# Patient Record
Sex: FEMALE | Race: WHITE | NOT HISPANIC OR LATINO | Employment: OTHER | URBAN - METROPOLITAN AREA
[De-identification: names, ages, dates, MRNs, and addresses within clinical notes are randomized per-mention and may not be internally consistent; named-entity substitution may affect disease eponyms.]

---

## 2018-04-24 ENCOUNTER — HOSPITAL ENCOUNTER (INPATIENT)
Facility: HOSPITAL | Age: 83
LOS: 2 days | Discharge: HOME HEALTH | DRG: 947 | End: 2018-04-26
Attending: SPECIALIST | Admitting: EMERGENCY MEDICINE
Payer: MEDICARE

## 2018-04-24 DIAGNOSIS — R40.4 ALTERED AWARENESS, TRANSIENT: Primary | ICD-10-CM

## 2018-04-24 DIAGNOSIS — E86.0 DEHYDRATION: ICD-10-CM

## 2018-04-24 DIAGNOSIS — I50.9 CHF (CONGESTIVE HEART FAILURE): ICD-10-CM

## 2018-04-24 DIAGNOSIS — R79.1 ELEVATED INR: ICD-10-CM

## 2018-04-24 DIAGNOSIS — R79.89 ELEVATED TROPONIN: ICD-10-CM

## 2018-04-24 PROBLEM — I48.91 ATRIAL FIBRILLATION: Status: ACTIVE | Noted: 2018-04-24

## 2018-04-24 PROBLEM — E87.5 HYPERKALEMIA: Status: ACTIVE | Noted: 2018-04-24

## 2018-04-24 PROBLEM — N17.9 ACUTE RENAL FAILURE: Status: ACTIVE | Noted: 2018-04-24

## 2018-04-24 PROBLEM — R53.1 GENERALIZED WEAKNESS: Status: ACTIVE | Noted: 2018-04-24

## 2018-04-24 LAB
25(OH)D3+25(OH)D2 SERPL-MCNC: 27 NG/ML
ALBUMIN SERPL BCP-MCNC: 3.6 G/DL
ALP SERPL-CCNC: 71 U/L
ALT SERPL W/O P-5'-P-CCNC: 17 U/L
AMMONIA PLAS-SCNC: 30 UMOL/L
AMPHET+METHAMPHET UR QL: NEGATIVE
ANION GAP SERPL CALC-SCNC: 9 MMOL/L
APAP SERPL-MCNC: <3 UG/ML
APTT BLDCRRT: 42.9 SEC
AST SERPL-CCNC: 29 U/L
BACTERIA #/AREA URNS HPF: NORMAL /HPF
BARBITURATES UR QL SCN>200 NG/ML: NEGATIVE
BASOPHILS # BLD AUTO: 0.03 K/UL
BASOPHILS NFR BLD: 0.5 %
BENZODIAZ UR QL SCN>200 NG/ML: NEGATIVE
BILIRUB SERPL-MCNC: 0.5 MG/DL
BILIRUB UR QL STRIP: NEGATIVE
BNP SERPL-MCNC: 287 PG/ML
BUN SERPL-MCNC: 37 MG/DL
BZE UR QL SCN: NEGATIVE
CALCIUM SERPL-MCNC: 10.1 MG/DL
CANNABINOIDS UR QL SCN: NEGATIVE
CHLORIDE SERPL-SCNC: 105 MMOL/L
CLARITY UR: ABNORMAL
CO2 SERPL-SCNC: 20 MMOL/L
COLOR UR: YELLOW
CREAT SERPL-MCNC: 1.7 MG/DL
CREAT UR-MCNC: 74.8 MG/DL
DIFFERENTIAL METHOD: ABNORMAL
EOSINOPHIL # BLD AUTO: 0.1 K/UL
EOSINOPHIL NFR BLD: 1.4 %
ERYTHROCYTE [DISTWIDTH] IN BLOOD BY AUTOMATED COUNT: 13.8 %
EST. GFR  (AFRICAN AMERICAN): 29 ML/MIN/1.73 M^2
EST. GFR  (NON AFRICAN AMERICAN): 25 ML/MIN/1.73 M^2
ETHANOL SERPL-MCNC: <10 MG/DL
FOLATE SERPL-MCNC: 4.3 NG/ML
GLUCOSE SERPL-MCNC: 112 MG/DL
GLUCOSE UR QL STRIP: NEGATIVE
HCT VFR BLD AUTO: 42.1 %
HGB BLD-MCNC: 14.4 G/DL
HGB UR QL STRIP: ABNORMAL
INR PPP: 4.5
KETONES UR QL STRIP: NEGATIVE
LEUKOCYTE ESTERASE UR QL STRIP: ABNORMAL
LYMPHOCYTES # BLD AUTO: 2.4 K/UL
LYMPHOCYTES NFR BLD: 41.3 %
MAGNESIUM SERPL-MCNC: 2.6 MG/DL
MCH RBC QN AUTO: 34.5 PG
MCHC RBC AUTO-ENTMCNC: 34.2 G/DL
MCV RBC AUTO: 101 FL
METHADONE UR QL SCN>300 NG/ML: NEGATIVE
MICROSCOPIC COMMENT: NORMAL
MONOCYTES # BLD AUTO: 0.6 K/UL
MONOCYTES NFR BLD: 10 %
NEUTROPHILS # BLD AUTO: 2.7 K/UL
NEUTROPHILS NFR BLD: 46.8 %
NITRITE UR QL STRIP: NEGATIVE
OPIATES UR QL SCN: NEGATIVE
PCP UR QL SCN>25 NG/ML: NEGATIVE
PH UR STRIP: 6 [PH] (ref 5–8)
PLATELET # BLD AUTO: 176 K/UL
PMV BLD AUTO: 10.2 FL
POTASSIUM SERPL-SCNC: 5.2 MMOL/L
PROCALCITONIN SERPL IA-MCNC: 0.05 NG/ML
PROT SERPL-MCNC: 7.5 G/DL
PROT UR QL STRIP: NEGATIVE
PROTHROMBIN TIME: 47.2 SEC
RBC # BLD AUTO: 4.17 M/UL
RBC #/AREA URNS HPF: 4 /HPF (ref 0–4)
SALICYLATES SERPL-MCNC: <5 MG/DL
SODIUM SERPL-SCNC: 134 MMOL/L
SP GR UR STRIP: 1.01 (ref 1–1.03)
SQUAMOUS #/AREA URNS HPF: 2 /HPF
TOXICOLOGY INFORMATION: NORMAL
TROPONIN I SERPL DL<=0.01 NG/ML-MCNC: 0.05 NG/ML
TROPONIN I SERPL DL<=0.01 NG/ML-MCNC: 0.06 NG/ML
TSH SERPL DL<=0.005 MIU/L-ACNC: 1.05 UIU/ML
URN SPEC COLLECT METH UR: ABNORMAL
UROBILINOGEN UR STRIP-ACNC: NEGATIVE EU/DL
VIT B12 SERPL-MCNC: 431 PG/ML
WBC # BLD AUTO: 5.79 K/UL
WBC #/AREA URNS HPF: 5 /HPF (ref 0–5)

## 2018-04-24 PROCEDURE — 96375 TX/PRO/DX INJ NEW DRUG ADDON: CPT | Mod: 59

## 2018-04-24 PROCEDURE — 83735 ASSAY OF MAGNESIUM: CPT

## 2018-04-24 PROCEDURE — 83880 ASSAY OF NATRIURETIC PEPTIDE: CPT

## 2018-04-24 PROCEDURE — 96361 HYDRATE IV INFUSION ADD-ON: CPT | Mod: 59

## 2018-04-24 PROCEDURE — 93005 ELECTROCARDIOGRAM TRACING: CPT

## 2018-04-24 PROCEDURE — 02HV33Z INSERTION OF INFUSION DEVICE INTO SUPERIOR VENA CAVA, PERCUTANEOUS APPROACH: ICD-10-PCS | Performed by: RADIOLOGY

## 2018-04-24 PROCEDURE — 93010 ELECTROCARDIOGRAM REPORT: CPT | Mod: ,,, | Performed by: INTERNAL MEDICINE

## 2018-04-24 PROCEDURE — 80329 ANALGESICS NON-OPIOID 1 OR 2: CPT

## 2018-04-24 PROCEDURE — 11000001 HC ACUTE MED/SURG PRIVATE ROOM

## 2018-04-24 PROCEDURE — 80307 DRUG TEST PRSMV CHEM ANLYZR: CPT

## 2018-04-24 PROCEDURE — 96374 THER/PROPH/DIAG INJ IV PUSH: CPT | Mod: 59

## 2018-04-24 PROCEDURE — 82607 VITAMIN B-12: CPT

## 2018-04-24 PROCEDURE — 84443 ASSAY THYROID STIM HORMONE: CPT

## 2018-04-24 PROCEDURE — 86592 SYPHILIS TEST NON-TREP QUAL: CPT

## 2018-04-24 PROCEDURE — 82140 ASSAY OF AMMONIA: CPT

## 2018-04-24 PROCEDURE — 84484 ASSAY OF TROPONIN QUANT: CPT

## 2018-04-24 PROCEDURE — 85730 THROMBOPLASTIN TIME PARTIAL: CPT

## 2018-04-24 PROCEDURE — 25000003 PHARM REV CODE 250: Performed by: NURSE PRACTITIONER

## 2018-04-24 PROCEDURE — 82306 VITAMIN D 25 HYDROXY: CPT

## 2018-04-24 PROCEDURE — 80053 COMPREHEN METABOLIC PANEL: CPT

## 2018-04-24 PROCEDURE — 36569 INSJ PICC 5 YR+ W/O IMAGING: CPT

## 2018-04-24 PROCEDURE — G0378 HOSPITAL OBSERVATION PER HR: HCPCS

## 2018-04-24 PROCEDURE — 63600175 PHARM REV CODE 636 W HCPCS: Performed by: NURSE PRACTITIONER

## 2018-04-24 PROCEDURE — 84145 PROCALCITONIN (PCT): CPT

## 2018-04-24 PROCEDURE — 99285 EMERGENCY DEPT VISIT HI MDM: CPT | Mod: 25

## 2018-04-24 PROCEDURE — 25000003 PHARM REV CODE 250: Performed by: SPECIALIST

## 2018-04-24 PROCEDURE — 81000 URINALYSIS NONAUTO W/SCOPE: CPT

## 2018-04-24 PROCEDURE — 85610 PROTHROMBIN TIME: CPT

## 2018-04-24 PROCEDURE — S0171 BUMETANIDE 0.5 MG: HCPCS | Performed by: NURSE PRACTITIONER

## 2018-04-24 PROCEDURE — 80320 DRUG SCREEN QUANTALCOHOLS: CPT

## 2018-04-24 PROCEDURE — 82746 ASSAY OF FOLIC ACID SERUM: CPT

## 2018-04-24 PROCEDURE — 85025 COMPLETE CBC W/AUTO DIFF WBC: CPT

## 2018-04-24 RX ORDER — DULOXETIN HYDROCHLORIDE 20 MG/1
20 CAPSULE, DELAYED RELEASE ORAL DAILY
Status: DISCONTINUED | OUTPATIENT
Start: 2018-04-25 | End: 2018-04-26 | Stop reason: HOSPADM

## 2018-04-24 RX ORDER — DORZOLAMIDE HCL 20 MG/ML
1 SOLUTION/ DROPS OPHTHALMIC 2 TIMES DAILY
COMMUNITY

## 2018-04-24 RX ORDER — ACEBUTOLOL HYDROCHLORIDE 200 MG/1
200 CAPSULE ORAL 2 TIMES DAILY
Status: DISCONTINUED | OUTPATIENT
Start: 2018-04-24 | End: 2018-04-26 | Stop reason: HOSPADM

## 2018-04-24 RX ORDER — RAMELTEON 8 MG/1
8 TABLET ORAL NIGHTLY PRN
Status: DISCONTINUED | OUTPATIENT
Start: 2018-04-24 | End: 2018-04-26 | Stop reason: HOSPADM

## 2018-04-24 RX ORDER — WARFARIN 1 MG/1
1 TABLET ORAL DAILY
Status: DISCONTINUED | OUTPATIENT
Start: 2018-04-26 | End: 2018-04-26

## 2018-04-24 RX ORDER — FAMOTIDINE 20 MG/1
20 TABLET, FILM COATED ORAL DAILY
Status: DISCONTINUED | OUTPATIENT
Start: 2018-04-25 | End: 2018-04-26 | Stop reason: HOSPADM

## 2018-04-24 RX ORDER — FLUTICASONE PROPIONATE 50 MCG
1 SPRAY, SUSPENSION (ML) NASAL DAILY
COMMUNITY
End: 2018-06-25

## 2018-04-24 RX ORDER — DORZOLAMIDE HCL 20 MG/ML
1 SOLUTION/ DROPS OPHTHALMIC 2 TIMES DAILY
Status: DISCONTINUED | OUTPATIENT
Start: 2018-04-24 | End: 2018-04-26 | Stop reason: HOSPADM

## 2018-04-24 RX ORDER — DULOXETIN HYDROCHLORIDE 20 MG/1
20 CAPSULE, DELAYED RELEASE ORAL DAILY
COMMUNITY

## 2018-04-24 RX ORDER — BUMETANIDE 0.25 MG/ML
1 INJECTION INTRAMUSCULAR; INTRAVENOUS 2 TIMES DAILY
Status: DISCONTINUED | OUTPATIENT
Start: 2018-04-24 | End: 2018-04-26 | Stop reason: HOSPADM

## 2018-04-24 RX ORDER — LEVOTHYROXINE SODIUM 88 UG/1
88 TABLET ORAL DAILY
COMMUNITY

## 2018-04-24 RX ORDER — LEVOTHYROXINE SODIUM 88 UG/1
88 TABLET ORAL
Status: DISCONTINUED | OUTPATIENT
Start: 2018-04-25 | End: 2018-04-26 | Stop reason: HOSPADM

## 2018-04-24 RX ORDER — ASPIRIN 325 MG
325 TABLET ORAL DAILY
Status: DISCONTINUED | OUTPATIENT
Start: 2018-04-25 | End: 2018-04-26 | Stop reason: HOSPADM

## 2018-04-24 RX ORDER — ONDANSETRON 2 MG/ML
4 INJECTION INTRAMUSCULAR; INTRAVENOUS EVERY 8 HOURS PRN
Status: DISCONTINUED | OUTPATIENT
Start: 2018-04-24 | End: 2018-04-26 | Stop reason: HOSPADM

## 2018-04-24 RX ORDER — ACETAMINOPHEN 325 MG/1
650 TABLET ORAL EVERY 6 HOURS PRN
Status: DISCONTINUED | OUTPATIENT
Start: 2018-04-24 | End: 2018-04-26 | Stop reason: HOSPADM

## 2018-04-24 RX ORDER — HYDRALAZINE HYDROCHLORIDE 20 MG/ML
10 INJECTION INTRAMUSCULAR; INTRAVENOUS EVERY 8 HOURS PRN
Status: DISCONTINUED | OUTPATIENT
Start: 2018-04-24 | End: 2018-04-26 | Stop reason: HOSPADM

## 2018-04-24 RX ORDER — BUMETANIDE 2 MG/1
2 TABLET ORAL DAILY
COMMUNITY

## 2018-04-24 RX ORDER — WARFARIN SODIUM 5 MG/1
5 TABLET ORAL DAILY
Status: ON HOLD | COMMUNITY
End: 2018-04-26

## 2018-04-24 RX ORDER — ACEBUTOLOL HYDROCHLORIDE 200 MG/1
200 CAPSULE ORAL 2 TIMES DAILY
COMMUNITY

## 2018-04-24 RX ADMIN — SODIUM CHLORIDE 1000 ML: 0.9 INJECTION, SOLUTION INTRAVENOUS at 11:04

## 2018-04-24 RX ADMIN — RAMELTEON 8 MG: 8 TABLET, FILM COATED ORAL at 11:04

## 2018-04-24 RX ADMIN — BUMETANIDE 1 MG: 0.25 INJECTION INTRAMUSCULAR; INTRAVENOUS at 06:04

## 2018-04-24 RX ADMIN — DORZOLAMIDE HYDROCHLORIDE 1 DROP: 20 SOLUTION/ DROPS OPHTHALMIC at 10:04

## 2018-04-24 RX ADMIN — HYDRALAZINE HYDROCHLORIDE 10 MG: 20 INJECTION INTRAMUSCULAR; INTRAVENOUS at 07:04

## 2018-04-24 RX ADMIN — ACEBUTOLOL HYDROCHLORIDE 200 MG: 200 CAPSULE ORAL at 10:04

## 2018-04-24 RX ADMIN — ACETAMINOPHEN 650 MG: 325 TABLET, FILM COATED ORAL at 10:04

## 2018-04-24 NOTE — OP NOTE
The LUE was sterilely prepped and draped.  Lidocaine was used as a local anesthetic.  Using u/s guidance a 5 Polish 37 cm picc was placed into the basilic vein into the SVC/right atrium junction.  Placement was verified using X Ray.  PICC was secured in place with attachment device and is ready to use.  Pt tolerated proc well without immediate complications.

## 2018-04-24 NOTE — ASSESSMENT & PLAN NOTE
-Improved    - Bumex 1 mg BID, can switch back to home PO dose tomorrow     - 2D ECHO showed normal left ventricular systolic function (EF 55-60%) and normal right ventricular systolic function, severe moderate to severe AS.   - Strict I&Os  - Daily weights  - Low salt diet  - Continue BB

## 2018-04-24 NOTE — ED PROVIDER NOTES
"SCRIBE #1 NOTE: I, Toni Bennett, am scribing for, and in the presence of, aMris Overton MD. I have scribed the entire note.      History      Chief Complaint   Patient presents with    Dementia     GCS 14, worsening memory per family, pt has been accusing people of breaking into her house       Review of patient's allergies indicates:  Allergies not on file     HPI   HPI    4/24/2018, 10:44 AM   History obtained from the family      History of Present Illness: Daija Edward is a 89 y.o. female patient w/ PMhx of Afib and Dementia presents to the Emergency Department for AMS which onset gradually last PM. Per the family, the patient "is hallucinating, saying that people are in her house when no one is there." Family also reports that the patient has had a decline in her mental status over the past month, stating that the patient "is more forgetful than she normally is." Symptoms are constant and moderate in severity.  No mitigating or exacerbating factors reported. HPI and ROS are limited due to patient's history of dementia.       Arrival mode: Ambulance service    PCP: Kylie Fragoso MD       Past Medical History:  Past Medical History:   Diagnosis Date    A-fib     Dementia        Past Surgical History:  Hx reviewed, not pertinent    Family History:  Hx reviewed, not pertinent    Social History:  Social History     Social History Main Topics    Smoking status: Not on file    Smokeless tobacco: Not on file    Alcohol use Not on file    Drug use: Unknown    Sexual activity: Not on file       ROS   Review of Systems   Unable to perform ROS: Dementia   Psychiatric/Behavioral:        + AMS       Physical Exam      Initial Vitals [04/24/18 1042]   BP Pulse Resp Temp SpO2   (!) 170/97 76 18 99 °F (37.2 °C) 99 %      MAP       121.33           Physical Exam  Nursing Notes and Vital Signs Reviewed.  Constitutional: Patient is in no apparent distress. Well-developed and well-nourished.  Head: Atraumatic. " "Normocephalic.  Eyes: PERRL. EOM intact. Conjunctivae are not pale. No scleral icterus.  ENT: Mucous membranes are moist. Oropharynx is clear and symmetric.    Neck: Supple. Full ROM. No lymphadenopathy.  Cardiovascular: Regular rate. Regular rhythm. No murmurs, rubs, or gallops. Distal pulses are 2+ and symmetric.  Pulmonary/Chest: No respiratory distress. Clear to auscultation bilaterally. No wheezing or rales.  Abdominal: Soft and non-distended.  There is no tenderness.  No rebound, guarding, or rigidity. Good bowel sounds.  Musculoskeletal: Moves all extremities. No obvious deformities. No edema. No calf tenderness. Chronic contractures of the right hand and arm.   Skin: Warm and dry. Multiple areas of ecchymosis noted to the BLE.   Neurological:  Alert, awake, and appropriate.  Normal speech.  No acute focal neurological deficits are appreciated.  Psychiatric: Normal affect. Good eye contact. Appropriate in content.    ED Course    Procedures  ED Vital Signs:  Vitals:    04/24/18 1042 04/24/18 1052 04/24/18 1102   BP: (!) 170/97  (!) 191/88   Pulse: 76  79   Resp: 18  16   Temp: 99 °F (37.2 °C)     TempSrc: Oral     SpO2: 99%  97%   Weight:  65.3 kg (144 lb)    Height: 5' 2" (1.575 m)         Abnormal Lab Results:  Labs Reviewed   CBC W/ AUTO DIFFERENTIAL - Abnormal; Notable for the following:        Result Value     (*)     MCH 34.5 (*)     All other components within normal limits   COMPREHENSIVE METABOLIC PANEL - Abnormal; Notable for the following:     Sodium 134 (*)     Potassium 5.2 (*)     CO2 20 (*)     Glucose 112 (*)     BUN, Bld 37 (*)     Creatinine 1.7 (*)     eGFR if  29 (*)     eGFR if non  25 (*)     All other components within normal limits   TROPONIN I - Abnormal; Notable for the following:     Troponin I 0.049 (*)     All other components within normal limits   B-TYPE NATRIURETIC PEPTIDE - Abnormal; Notable for the following:      (*)     All " other components within normal limits   URINALYSIS - Abnormal; Notable for the following:     Appearance, UA Hazy (*)     Occult Blood UA Trace (*)     Leukocytes, UA Trace (*)     All other components within normal limits   MAGNESIUM   DRUG SCREEN PANEL, URINE EMERGENCY   URINALYSIS MICROSCOPIC   AMMONIA   TSH   ACETAMINOPHEN LEVEL   ALCOHOL,MEDICAL (ETHANOL)   SALICYLATE LEVEL   PROTIME-INR   APTT   PROCALCITONIN   RPR   VITAMIN B12   VITAMIN D   FOLATE        All Lab Results:  Results for orders placed or performed during the hospital encounter of 04/24/18   CBC auto differential   Result Value Ref Range    WBC 5.79 3.90 - 12.70 K/uL    RBC 4.17 4.00 - 5.40 M/uL    Hemoglobin 14.4 12.0 - 16.0 g/dL    Hematocrit 42.1 37.0 - 48.5 %     (H) 82 - 98 fL    MCH 34.5 (H) 27.0 - 31.0 pg    MCHC 34.2 32.0 - 36.0 g/dL    RDW 13.8 11.5 - 14.5 %    Platelets 176 150 - 350 K/uL    MPV 10.2 9.2 - 12.9 fL    Gran # (ANC) 2.7 1.8 - 7.7 K/uL    Lymph # 2.4 1.0 - 4.8 K/uL    Mono # 0.6 0.3 - 1.0 K/uL    Eos # 0.1 0.0 - 0.5 K/uL    Baso # 0.03 0.00 - 0.20 K/uL    Gran% 46.8 38.0 - 73.0 %    Lymph% 41.3 18.0 - 48.0 %    Mono% 10.0 4.0 - 15.0 %    Eosinophil% 1.4 0.0 - 8.0 %    Basophil% 0.5 0.0 - 1.9 %    Differential Method Automated    Comprehensive metabolic panel   Result Value Ref Range    Sodium 134 (L) 136 - 145 mmol/L    Potassium 5.2 (H) 3.5 - 5.1 mmol/L    Chloride 105 95 - 110 mmol/L    CO2 20 (L) 23 - 29 mmol/L    Glucose 112 (H) 70 - 110 mg/dL    BUN, Bld 37 (H) 10 - 30 mg/dL    Creatinine 1.7 (H) 0.5 - 1.4 mg/dL    Calcium 10.1 8.7 - 10.5 mg/dL    Total Protein 7.5 6.0 - 8.4 g/dL    Albumin 3.6 3.5 - 5.2 g/dL    Total Bilirubin 0.5 0.1 - 1.0 mg/dL    Alkaline Phosphatase 71 55 - 135 U/L    AST 29 10 - 40 U/L    ALT 17 10 - 44 U/L    Anion Gap 9 8 - 16 mmol/L    eGFR if African American 29 (A) >60 mL/min/1.73 m^2    eGFR if non African American 25 (A) >60 mL/min/1.73 m^2   Magnesium   Result Value Ref Range     Magnesium 2.6 1.6 - 2.6 mg/dL   Troponin I   Result Value Ref Range    Troponin I 0.049 (H) 0.000 - 0.026 ng/mL   Brain natriuretic peptide   Result Value Ref Range     (H) 0 - 99 pg/mL   Urinalysis   Result Value Ref Range    Specimen UA Urine, Clean Catch     Color, UA Yellow Yellow, Straw, Nadeen    Appearance, UA Hazy (A) Clear    pH, UA 6.0 5.0 - 8.0    Specific Gravity, UA 1.010 1.005 - 1.030    Protein, UA Negative Negative    Glucose, UA Negative Negative    Ketones, UA Negative Negative    Bilirubin (UA) Negative Negative    Occult Blood UA Trace (A) Negative    Nitrite, UA Negative Negative    Urobilinogen, UA Negative <2.0 EU/dL    Leukocytes, UA Trace (A) Negative   Drug screen panel, emergency   Result Value Ref Range    Benzodiazepines Negative     Methadone metabolites Negative     Cocaine (Metab.) Negative     Opiate Scrn, Ur Negative     Barbiturate Screen, Ur Negative     Amphetamine Screen, Ur Negative     THC Negative     Phencyclidine Negative     Creatinine, Random Ur 74.8 15.0 - 325.0 mg/dL    Toxicology Information SEE COMMENT    Urinalysis Microscopic   Result Value Ref Range    RBC, UA 4 0 - 4 /hpf    WBC, UA 5 0 - 5 /hpf    Bacteria, UA Rare None-Occ /hpf    Squam Epithel, UA 2 /hpf    Microscopic Comment SEE COMMENT          Imaging Results:  Imaging Results          FL PICC Line Placement (xpd) (In process)                CT Head Without Contrast (Final result)  Result time 04/24/18 14:13:53    Final result by Ino Han MD (04/24/18 14:13:53)                 Impression:           No CT evidence of acute intracranial abnormality.  Senescent changes.    All CT scans at this facility use dose modulation, iterative reconstruction and/or weight based dosing when appropriate to reduce radiation dose to as low as reasonably achievable.       Electronically signed by: INO HAN MD  Date:     04/24/18  Time:    14:13              Narrative:    Exam:  CT HEAD WITHOUT  CONTRAST    Clinical History:  Altered mental status. Confusion/delirium, altered LOC, unexplained     Technique: Axial CT imaging was performed through the head without intravenous contrast.     Comparison:  None     Findings:     Age appropriate generalized cerebral and cerebellar atrophy. Moderate, symmetric, low density changes in the periventricular white matter consistent with chronic small vessel ischemic change.   No intracranial hemorrhage is identified.   No hydrocephalus, midline shift or mass effect.  The calvarium is intact.   Visualized paranasal sinuses and mastoid air cells are clear.   Atheromatous calcifications involve the bilateral cavernous carotid arteries.                             X-Ray Chest 1 View (Final result)  Result time 04/24/18 11:57:37    Final result by Ino Han MD (04/24/18 11:57:37)                 Impression:     No acute infiltrates.  Cardiomegaly.            Electronically signed by: INO HAN MD  Date:     04/24/18  Time:    11:57              Narrative:    Exam: XR CHEST 1 VIEW    Clinical History: Shortness of breath.  Altered mental status.    Findings:     The lungs are clear. Marked cardiomegaly.  Aortic atherosclerosis.  Posttraumatic and glenohumeral effusion.                             The EKG was ordered, reviewed, and independently interpreted by the ED provider.  Interpretation time: 11:48  Rate: 80 BPM  Rhythm: atrial fibrillation with premature ventricular or aberrantly conducted complexes  Interpretation: Incomplete RBBB. Anteroseptal infarct. No STEMI.           The Emergency Provider reviewed the vital signs and test results, which are outlined above.    ED Discussion     4:07 PM: Discussed case with MAURICIO Alexander ([facility]). Dr. Syed agrees with current care and management of pt and accepts admission.   Admitting Service: Hospital medicine   Admitting Physician: Dr. Syed  Admit to: Obs/ tele    4:07 PM: Re-evaluated pt. I have  discussed test results, shared treatment plan, and the need for admission with patient and family at bedside. Pt and family express understanding at this time and agree with all information. All questions answered. Pt and family have no further questions or concerns at this time. Pt is ready for admit.      ED Medication(s):  Medications   sodium chloride 0.9% bolus 1,000 mL (0 mLs Intravenous Stopped 4/24/18 1330)       New Prescriptions    No medications on file             Medical Decision Making    Medical Decision Making:   Clinical Tests:   Lab Tests: Reviewed and Ordered  Radiological Study: Reviewed and Ordered  Medical Tests: Reviewed and Ordered           Scribe Attestation:   Scribe #1: I performed the above scribed service and the documentation accurately describes the services I performed. I attest to the accuracy of the note.    Attending:   Physician Attestation Statement for Scribe #1: I, Maris Overton MD, personally performed the services described in this documentation, as scribed by Toni Guajardo, in my presence, and it is both accurate and complete.          Clinical Impression       ICD-10-CM ICD-9-CM   1. Altered awareness, transient R40.4 780.02   2. CHF (congestive heart failure) I50.9 428.0   3. Dehydration E86.0 276.51   4. Elevated troponin R74.8 790.6       Disposition:   Disposition: Placed in Observation  Condition: Fair         Maris Overton MD  04/24/18 0330

## 2018-04-24 NOTE — ASSESSMENT & PLAN NOTE
-   - PE: moderate nonpitting edema to BLE  - Pt reports she takes Lasix daily but has been noticing her legs swelling more  - Bumex 1 mg IVP BID  - Strict I&Os  - Daily weights  - Low salt diet  - Continue BB

## 2018-04-24 NOTE — H&P
"[facility] Hospital Medicine  History & Physical    Patient Name: Daija Edward  MRN: [id number]  Admission Date: 4/24/2018  Attending Physician: Maris Overton MD   Primary Care Provider: Kylie Fragoso MD         Patient information was obtained from patient, relative(s) and ER records.     Subjective:     Principal Problem:Altered awareness, transient    Chief Complaint:   Chief Complaint   Patient presents with    Dementia     GCS 14, worsening memory per family, pt has been accusing people of breaking into her house        HPI: Daija Edward is a 89 year female with a PMHx of CHF, A-fib, and Migraines who presented to the Emergency Department with c/o AMS. Family reports pt stays by herself with sitter care for a couple of hours a day. Pt reportedly having hallucinations of people trying to come into her house. Police department was called twice. Step daughter and grand daughter at bedside also reports that the patient has had a decline in her mental status over the past month, stating that the patient "is more forgetful than she normally is." Family denies hx of Dementia. Pt states she only saw a neurologist for her hx of migraines. Family is in the process of trying to get 24 hr sitter care. ED workup revealed: CBC stable, K+ 5.4, BUN 37, creatinine 1.7, , troponin 0.049. CXR with no acute infiltrates, cardiomegaly. CT head with no acute intracranial findings, chronic small vessel ischemic change. Pt placed in Observation.    Past Medical History:   Diagnosis Date    A-fib     Dementia        No past surgical history on file.    Review of patient's allergies indicates:  No Known Allergies    No current facility-administered medications on file prior to encounter.      No current outpatient prescriptions on file prior to encounter.     Family History     Reviewed. Not pertinent        Social History Main Topics    Smoking status: Not on file    Smokeless tobacco: Not on file    " Alcohol use Not on file    Drug use: Unknown    Sexual activity: Not on file     Review of Systems   Constitutional: Positive for activity change.   HENT: Negative.    Eyes: Negative.    Respiratory: Negative for apnea, cough, choking, chest tightness, shortness of breath, wheezing and stridor.    Cardiovascular: Positive for leg swelling. Negative for chest pain and palpitations.   Gastrointestinal: Negative for abdominal pain, constipation, diarrhea, nausea and vomiting.   Endocrine: Negative.    Genitourinary: Negative.    Musculoskeletal: Negative.    Skin: Negative.    Allergic/Immunologic: Negative.    Neurological: Negative for dizziness, tremors, seizures, syncope, facial asymmetry, speech difficulty, weakness, light-headedness, numbness and headaches.   Hematological: Negative.    Psychiatric/Behavioral: Positive for hallucinations.     Objective:     Vital Signs (Most Recent):  Temp: 99 °F (37.2 °C) (04/24/18 1042)  Pulse: 79 (04/24/18 1102)  Resp: 16 (04/24/18 1102)  BP: (!) 191/88 (04/24/18 1102)  SpO2: 97 % (04/24/18 1102) Vital Signs (24h Range):  Temp:  [99 °F (37.2 °C)] 99 °F (37.2 °C)  Pulse:  [76-79] 79  Resp:  [16-18] 16  SpO2:  [97 %-99 %] 97 %  BP: (170-191)/(88-97) 191/88     Weight: 65.3 kg (144 lb)  Body mass index is 26.34 kg/m².    Physical Exam   Constitutional: She is oriented to person, place, and time. She appears well-developed. No distress.   HENT:   Head: Normocephalic and atraumatic.   Eyes: EOM are normal.   Neck: Normal range of motion. Neck supple.   Cardiovascular: Normal rate.  An irregular rhythm present.   Murmur heard.  A-fib noted  Moderate nonpitting edema to BLE   Pulmonary/Chest: Effort normal and breath sounds normal. No respiratory distress. She has no wheezes. She has no rales. She exhibits no tenderness.   Abdominal: Soft. Bowel sounds are normal. She exhibits no distension. There is no tenderness. There is no rebound and no guarding.   Genitourinary:    Genitourinary Comments: Deferred   Musculoskeletal: She exhibits edema.   Neurological: She is alert and oriented to person, place, and time. No sensory deficit.   Skin: Skin is warm and dry. Capillary refill takes less than 2 seconds.   Psychiatric: She has a normal mood and affect. Her behavior is normal. Judgment and thought content normal.   Nursing note and vitals reviewed.        CRANIAL NERVES     CN III, IV, VI   Extraocular motions are normal.        Significant Labs:   CBC:   Recent Labs  Lab 04/24/18  1403   WBC 5.79   HGB 14.4   HCT 42.1        CMP:   Recent Labs  Lab 04/24/18  1403   *   K 5.2*      CO2 20*   *   BUN 37*   CREATININE 1.7*   CALCIUM 10.1   PROT 7.5   ALBUMIN 3.6   BILITOT 0.5   ALKPHOS 71   AST 29   ALT 17   ANIONGAP 9   EGFRNONAA 25*     Magnesium:   Recent Labs  Lab 04/24/18  1403   MG 2.6     Troponin:   Recent Labs  Lab 04/24/18  1403   TROPONINI 0.049*     Urine Studies:   Recent Labs  Lab 04/24/18  1255   COLORU Yellow   APPEARANCEUA Hazy*   PHUR 6.0   SPECGRAV 1.010   PROTEINUA Negative   GLUCUA Negative   KETONESU Negative   BILIRUBINUA Negative   OCCULTUA Trace*   NITRITE Negative   UROBILINOGEN Negative   LEUKOCYTESUR Trace*   RBCUA 4   WBCUA 5   BACTERIA Rare   SQUAMEPITHEL 2       Significant Imaging:   Imaging Results          FL PICC Line Placement (xpd) (Final result)  Result time 04/24/18 16:16:24    Final result by Ino Han MD (04/24/18 16:16:24)                 Impression:      1. Successful placement of a  PICC, using ultrasound for access and fluoroscopy to determine position of the PICC.         Electronically signed by: INO HAN MD  Date:     04/24/18  Time:    16:16              Narrative:    Procedure:     Fluoroscopically guided PICC placement.    Clinical History:   Poor IV access    Technique/findings: After the risks, benefits and options of the planned procedure were explained to the  daughter  in full detail,  she   expressed complete understanding and gave  her  informed consent.  Casper Juarez PA-C, assisted in this procedure.  The  left  upper arm was prepped and draped in the usual sterile fashion. Sterile maximum barrier protocol was utilized.  A timeout was performed. 1% lidocaine was used as a local anesthetic, and under ultrasound guidance, a micropuncture set was used to gain access to the  basilic  vein. Through a peel-away sheath, a 5  [place] dual-lumen PICC trimmed to  37  cm was placed. Fluoroscopy was used to determine the position of the PICC , and the tip is within the SVC  . The PICC was then anchored into place, capped and flushed. A sterile dressing was applied. The patient tolerated the procedure well without complication.                             CT Head Without Contrast (Final result)  Result time 04/24/18 14:13:53    Final result by Ino Han MD (04/24/18 14:13:53)                 Impression:           No CT evidence of acute intracranial abnormality.  Senescent changes.    All CT scans at this facility use dose modulation, iterative reconstruction and/or weight based dosing when appropriate to reduce radiation dose to as low as reasonably achievable.       Electronically signed by: INO HAN MD  Date:     04/24/18  Time:    14:13              Narrative:    Exam:  CT HEAD WITHOUT CONTRAST    Clinical History:  Altered mental status. Confusion/delirium, altered LOC, unexplained     Technique: Axial CT imaging was performed through the head without intravenous contrast.     Comparison:  None     Findings:     Age appropriate generalized cerebral and cerebellar atrophy. Moderate, symmetric, low density changes in the periventricular white matter consistent with chronic small vessel ischemic change.   No intracranial hemorrhage is identified.   No hydrocephalus, midline shift or mass effect.  The calvarium is intact.   Visualized paranasal sinuses and mastoid air cells are clear.   Atheromatous  calcifications involve the bilateral cavernous carotid arteries.                             X-Ray Chest 1 View (Final result)  Result time 04/24/18 11:57:37    Final result by Ino Han MD (04/24/18 11:57:37)                 Impression:     No acute infiltrates.  Cardiomegaly.            Electronically signed by: INO HAN MD  Date:     04/24/18  Time:    11:57              Narrative:    Exam: XR CHEST 1 VIEW    Clinical History: Shortness of breath.  Altered mental status.    Findings:     The lungs are clear. Marked cardiomegaly.  Aortic atherosclerosis.  Posttraumatic and glenohumeral effusion.                            Assessment/Plan:     * Altered awareness, transient    - Observation  - Pt was experiencing hallucinations of people coming into her house when they were not. Police called out twice. Pt lives alone with sitter availability a couple hours/day  - CXR and UA negative for infectious process  - CT head with no acute intracranial process, chronic small vessel ischemic change  - Pt currently AOO x 3  - Pt and family deny hx of dementia  - Pt and family reports a recent decline mental status and pt reports she is aware she cant live alone  - Family in process of getting 24 hr sitter care  - Neuro checks  - Inpatient consult to social work for discharge planning          Acute exacerbation of CHF (congestive heart failure)    -   - PE: moderate nonpitting edema to BLE  - Pt reports she takes Lasix daily but has been noticing her legs swelling more  - Bumex 1 mg IVP BID  - 2D ECHO pending  - Strict I&Os  - Daily weights  - Low salt diet  - Continue BB          Acute renal failure    - Creatinine currently 1.7  - Unknown baseline  - Will gently diurese and monitor          Hyperkalemia    - K+ 5.4  - Will diurese and K+ should trend down  - Repeat BMP in AM          Atrial fibrillation    - Continue BB and Coumadin  - Pharmacy to dose Coumadin          Generalized weakness    - PT/OT to  evaluate and treat            VTE Risk Mitigation     Pharmacy to dose Warfarin             JF Bryant  Department of Hospital Medicine   [facility]

## 2018-04-24 NOTE — PROGRESS NOTES
Pharmacist Renal Dose Adjustment Note    Daija Edward is a 89 y.o. female being treated with the medication famotidine (Pepcid) for stress ulcer prophylaxis.    Patient Data:    Vital Signs (Most Recent):  Temp: 99 °F (37.2 °C) (04/24/18 1042)  Pulse: 79 (04/24/18 1102)  Resp: 16 (04/24/18 1102)  BP: (!) 191/88 (04/24/18 1102)  SpO2: 97 % (04/24/18 1102)   Vital Signs (72h Range):  Temp:  [99 °F (37.2 °C)]   Pulse:  [76-79]   Resp:  [16-18]   BP: (170-191)/(88-97)   SpO2:  [97 %-99 %]        Recent Labs     Lab 04/24/18  1403   CREATININE 1.7*     Serum creatinine: 1.7 mg/dL (H) 04/24/18 1403  Estimated creatinine clearance: 17.6 mL/min (A)    Pepcid 20 mg PO twice daily will be decreased to Pepcid 20 mg PO once daily due to CrCl < 50 ml/min.    Pharmacist's Name: Katherine E Mcardle  Pharmacist's Extension: [phone]

## 2018-04-24 NOTE — HPI
"Daija Edward is a 89 year female with a PMHx of CHF, A-fib, and Migraines who presented to the Emergency Department with c/o AMS. Family reports pt stays by herself with sitter care for a couple of hours a day. Pt reportedly having hallucinations of people trying to come into her house. Police department was called twice. Step daughter and grand daughter at bedside also reports that the patient has had a decline in her mental status over the past month, stating that the patient "is more forgetful than she normally is." Family denies hx of Dementia. Pt states she only saw a neurologist for her hx of migraines. Family is in the process of trying to get 24 hr sitter care. ED workup revealed: CBC stable, K+ 5.4, BUN 37, creatinine 1.7, , troponin 0.049. CXR with no acute infiltrates, cardiomegaly. CT head with no acute intracranial findings, chronic small vessel ischemic change. Pt placed in Observation.  "

## 2018-04-24 NOTE — SUBJECTIVE & OBJECTIVE
Past Medical History:   Diagnosis Date    A-fib     Dementia        No past surgical history on file.    Review of patient's allergies indicates:  No Known Allergies    No current facility-administered medications on file prior to encounter.      No current outpatient prescriptions on file prior to encounter.     Family History     None        Social History Main Topics    Smoking status: Not on file    Smokeless tobacco: Not on file    Alcohol use Not on file    Drug use: Unknown    Sexual activity: Not on file     Review of Systems   Constitutional: Positive for activity change.   HENT: Negative.    Eyes: Negative.    Respiratory: Negative for apnea, cough, choking, chest tightness, shortness of breath, wheezing and stridor.    Cardiovascular: Positive for leg swelling. Negative for chest pain and palpitations.   Gastrointestinal: Negative for abdominal pain, constipation, diarrhea, nausea and vomiting.   Endocrine: Negative.    Genitourinary: Negative.    Musculoskeletal: Negative.    Skin: Negative.    Allergic/Immunologic: Negative.    Neurological: Negative for dizziness, tremors, seizures, syncope, facial asymmetry, speech difficulty, weakness, light-headedness, numbness and headaches.   Hematological: Negative.    Psychiatric/Behavioral: Positive for hallucinations.     Objective:     Vital Signs (Most Recent):  Temp: 99 °F (37.2 °C) (04/24/18 1042)  Pulse: 79 (04/24/18 1102)  Resp: 16 (04/24/18 1102)  BP: (!) 191/88 (04/24/18 1102)  SpO2: 97 % (04/24/18 1102) Vital Signs (24h Range):  Temp:  [99 °F (37.2 °C)] 99 °F (37.2 °C)  Pulse:  [76-79] 79  Resp:  [16-18] 16  SpO2:  [97 %-99 %] 97 %  BP: (170-191)/(88-97) 191/88     Weight: 65.3 kg (144 lb)  Body mass index is 26.34 kg/m².    Physical Exam   Constitutional: She is oriented to person, place, and time. She appears well-developed. No distress.   HENT:   Head: Normocephalic and atraumatic.   Eyes: EOM are normal.   Neck: Normal range of motion. Neck  supple.   Cardiovascular: Normal rate.  An irregular rhythm present.   Murmur heard.  A-fib noted  Moderate nonpitting edema to BLE   Pulmonary/Chest: Effort normal and breath sounds normal. No respiratory distress. She has no wheezes. She has no rales. She exhibits no tenderness.   Abdominal: Soft. Bowel sounds are normal. She exhibits no distension. There is no tenderness. There is no rebound and no guarding.   Genitourinary:   Genitourinary Comments: Deferred   Musculoskeletal: She exhibits edema.   Neurological: She is alert and oriented to person, place, and time. No sensory deficit.   Skin: Skin is warm and dry. Capillary refill takes less than 2 seconds.   Psychiatric: She has a normal mood and affect. Her behavior is normal. Judgment and thought content normal.   Nursing note and vitals reviewed.        CRANIAL NERVES     CN III, IV, VI   Extraocular motions are normal.        Significant Labs:   CBC:   Recent Labs  Lab 04/24/18  1403   WBC 5.79   HGB 14.4   HCT 42.1        CMP:   Recent Labs  Lab 04/24/18  1403   *   K 5.2*      CO2 20*   *   BUN 37*   CREATININE 1.7*   CALCIUM 10.1   PROT 7.5   ALBUMIN 3.6   BILITOT 0.5   ALKPHOS 71   AST 29   ALT 17   ANIONGAP 9   EGFRNONAA 25*     Magnesium:   Recent Labs  Lab 04/24/18  1403   MG 2.6     Troponin:   Recent Labs  Lab 04/24/18  1403   TROPONINI 0.049*     Urine Studies:   Recent Labs  Lab 04/24/18  1255   COLORU Yellow   APPEARANCEUA Hazy*   PHUR 6.0   SPECGRAV 1.010   PROTEINUA Negative   GLUCUA Negative   KETONESU Negative   BILIRUBINUA Negative   OCCULTUA Trace*   NITRITE Negative   UROBILINOGEN Negative   LEUKOCYTESUR Trace*   RBCUA 4   WBCUA 5   BACTERIA Rare   SQUAMEPITHEL 2       Significant Imaging:   Imaging Results          FL PICC Line Placement (xpd) (Final result)  Result time 04/24/18 16:16:24    Final result by Leno Han MD (04/24/18 16:16:24)                 Impression:      1. Successful placement of a  PICC,  using ultrasound for access and fluoroscopy to determine position of the PICC.         Electronically signed by: INO HAN MD  Date:     04/24/18  Time:    16:16              Narrative:    Procedure:     Fluoroscopically guided PICC placement.    Clinical History:   Poor IV access    Technique/findings: After the risks, benefits and options of the planned procedure were explained to the  daughter  in full detail,  she  expressed complete understanding and gave  her  informed consent.  Casper Juarez PA-C, assisted in this procedure.  The  left  upper arm was prepped and draped in the usual sterile fashion. Sterile maximum barrier protocol was utilized.  A timeout was performed. 1% lidocaine was used as a local anesthetic, and under ultrasound guidance, a micropuncture set was used to gain access to the  basilic  vein. Through a peel-away sheath, a 5  [place] dual-lumen PICC trimmed to  37  cm was placed. Fluoroscopy was used to determine the position of the PICC , and the tip is within the SVC  . The PICC was then anchored into place, capped and flushed. A sterile dressing was applied. The patient tolerated the procedure well without complication.                             CT Head Without Contrast (Final result)  Result time 04/24/18 14:13:53    Final result by Ino Han MD (04/24/18 14:13:53)                 Impression:           No CT evidence of acute intracranial abnormality.  Senescent changes.    All CT scans at this facility use dose modulation, iterative reconstruction and/or weight based dosing when appropriate to reduce radiation dose to as low as reasonably achievable.       Electronically signed by: INO HAN MD  Date:     04/24/18  Time:    14:13              Narrative:    Exam:  CT HEAD WITHOUT CONTRAST    Clinical History:  Altered mental status. Confusion/delirium, altered LOC, unexplained     Technique: Axial CT imaging was performed through the head without intravenous contrast.      Comparison:  None     Findings:     Age appropriate generalized cerebral and cerebellar atrophy. Moderate, symmetric, low density changes in the periventricular white matter consistent with chronic small vessel ischemic change.   No intracranial hemorrhage is identified.   No hydrocephalus, midline shift or mass effect.  The calvarium is intact.   Visualized paranasal sinuses and mastoid air cells are clear.   Atheromatous calcifications involve the bilateral cavernous carotid arteries.                             X-Ray Chest 1 View (Final result)  Result time 04/24/18 11:57:37    Final result by Ino Han MD (04/24/18 11:57:37)                 Impression:     No acute infiltrates.  Cardiomegaly.            Electronically signed by: INO HAN MD  Date:     04/24/18  Time:    11:57              Narrative:    Exam: XR CHEST 1 VIEW    Clinical History: Shortness of breath.  Altered mental status.    Findings:     The lungs are clear. Marked cardiomegaly.  Aortic atherosclerosis.  Posttraumatic and glenohumeral effusion.

## 2018-04-25 PROBLEM — R79.1 ELEVATED INR: Status: ACTIVE | Noted: 2018-04-25

## 2018-04-25 LAB
ALBUMIN SERPL BCP-MCNC: 3.2 G/DL
ALP SERPL-CCNC: 67 U/L
ALT SERPL W/O P-5'-P-CCNC: 15 U/L
ANION GAP SERPL CALC-SCNC: 7 MMOL/L
AORTIC VALVE REGURGITATION: ABNORMAL
AORTIC VALVE STENOSIS: ABNORMAL
AST SERPL-CCNC: 21 U/L
BASOPHILS # BLD AUTO: 0.02 K/UL
BASOPHILS NFR BLD: 0.3 %
BILIRUB SERPL-MCNC: 0.5 MG/DL
BUN SERPL-MCNC: 33 MG/DL
CALCIUM SERPL-MCNC: 9.2 MG/DL
CHLORIDE SERPL-SCNC: 106 MMOL/L
CO2 SERPL-SCNC: 24 MMOL/L
CREAT SERPL-MCNC: 1.3 MG/DL
DIFFERENTIAL METHOD: ABNORMAL
EOSINOPHIL # BLD AUTO: 0.1 K/UL
EOSINOPHIL NFR BLD: 2.3 %
ERYTHROCYTE [DISTWIDTH] IN BLOOD BY AUTOMATED COUNT: 13.9 %
EST. GFR  (AFRICAN AMERICAN): 40 ML/MIN/1.73 M^2
EST. GFR  (NON AFRICAN AMERICAN): 35 ML/MIN/1.73 M^2
ESTIMATED PA SYSTOLIC PRESSURE: 71.23
GLUCOSE SERPL-MCNC: 98 MG/DL
HCT VFR BLD AUTO: 37.4 %
HGB BLD-MCNC: 12.5 G/DL
INR PPP: 4.6
LYMPHOCYTES # BLD AUTO: 2 K/UL
LYMPHOCYTES NFR BLD: 34.8 %
MCH RBC QN AUTO: 33.6 PG
MCHC RBC AUTO-ENTMCNC: 33.4 G/DL
MCV RBC AUTO: 101 FL
MITRAL VALVE MOBILITY: ABNORMAL
MITRAL VALVE REGURGITATION: ABNORMAL
MONOCYTES # BLD AUTO: 0.8 K/UL
MONOCYTES NFR BLD: 14.3 %
NEUTROPHILS # BLD AUTO: 2.8 K/UL
NEUTROPHILS NFR BLD: 48.3 %
PLATELET # BLD AUTO: 182 K/UL
PMV BLD AUTO: 10.1 FL
POTASSIUM SERPL-SCNC: 3.8 MMOL/L
PROT SERPL-MCNC: 6.3 G/DL
PROTHROMBIN TIME: 47.8 SEC
RBC # BLD AUTO: 3.72 M/UL
RETIRED EF AND QEF - SEE NOTES: 55 (ref 55–65)
RPR SER QL: NORMAL
SODIUM SERPL-SCNC: 137 MMOL/L
TRICUSPID VALVE REGURGITATION: ABNORMAL
TROPONIN I SERPL DL<=0.01 NG/ML-MCNC: 0.06 NG/ML
WBC # BLD AUTO: 5.74 K/UL

## 2018-04-25 PROCEDURE — 25000003 PHARM REV CODE 250: Performed by: SPECIALIST

## 2018-04-25 PROCEDURE — G8988 SELF CARE GOAL STATUS: HCPCS | Mod: CK

## 2018-04-25 PROCEDURE — 25000003 PHARM REV CODE 250: Performed by: NURSE PRACTITIONER

## 2018-04-25 PROCEDURE — 97535 SELF CARE MNGMENT TRAINING: CPT

## 2018-04-25 PROCEDURE — 97116 GAIT TRAINING THERAPY: CPT

## 2018-04-25 PROCEDURE — 80053 COMPREHEN METABOLIC PANEL: CPT

## 2018-04-25 PROCEDURE — 11000001 HC ACUTE MED/SURG PRIVATE ROOM

## 2018-04-25 PROCEDURE — 97162 PT EVAL MOD COMPLEX 30 MIN: CPT

## 2018-04-25 PROCEDURE — 85610 PROTHROMBIN TIME: CPT

## 2018-04-25 PROCEDURE — S0171 BUMETANIDE 0.5 MG: HCPCS | Performed by: NURSE PRACTITIONER

## 2018-04-25 PROCEDURE — 84484 ASSAY OF TROPONIN QUANT: CPT

## 2018-04-25 PROCEDURE — G8987 SELF CARE CURRENT STATUS: HCPCS | Mod: CL

## 2018-04-25 PROCEDURE — G8979 MOBILITY GOAL STATUS: HCPCS | Mod: CI

## 2018-04-25 PROCEDURE — 97166 OT EVAL MOD COMPLEX 45 MIN: CPT

## 2018-04-25 PROCEDURE — 85025 COMPLETE CBC W/AUTO DIFF WBC: CPT

## 2018-04-25 PROCEDURE — 63600175 PHARM REV CODE 636 W HCPCS: Performed by: NURSE PRACTITIONER

## 2018-04-25 PROCEDURE — 93306 TTE W/DOPPLER COMPLETE: CPT

## 2018-04-25 PROCEDURE — 93306 TTE W/DOPPLER COMPLETE: CPT | Mod: 26,,, | Performed by: INTERNAL MEDICINE

## 2018-04-25 PROCEDURE — G8978 MOBILITY CURRENT STATUS: HCPCS | Mod: CK

## 2018-04-25 RX ORDER — LORAZEPAM 0.5 MG/1
0.5 TABLET ORAL EVERY 6 HOURS PRN
COMMUNITY
End: 2018-07-02 | Stop reason: SDUPTHER

## 2018-04-25 RX ADMIN — BUMETANIDE 1 MG: 0.25 INJECTION INTRAMUSCULAR; INTRAVENOUS at 09:04

## 2018-04-25 RX ADMIN — HYDRALAZINE HYDROCHLORIDE 10 MG: 20 INJECTION INTRAMUSCULAR; INTRAVENOUS at 04:04

## 2018-04-25 RX ADMIN — ASPIRIN 325 MG ORAL TABLET 325 MG: 325 PILL ORAL at 08:04

## 2018-04-25 RX ADMIN — DORZOLAMIDE HYDROCHLORIDE 1 DROP: 20 SOLUTION/ DROPS OPHTHALMIC at 08:04

## 2018-04-25 RX ADMIN — RAMELTEON 8 MG: 8 TABLET, FILM COATED ORAL at 08:04

## 2018-04-25 RX ADMIN — FAMOTIDINE 20 MG: 20 TABLET, FILM COATED ORAL at 08:04

## 2018-04-25 RX ADMIN — LEVOTHYROXINE SODIUM 88 MCG: 88 TABLET ORAL at 06:04

## 2018-04-25 RX ADMIN — ACEBUTOLOL HYDROCHLORIDE 200 MG: 200 CAPSULE ORAL at 08:04

## 2018-04-25 RX ADMIN — BUMETANIDE 1 MG: 0.25 INJECTION INTRAMUSCULAR; INTRAVENOUS at 05:04

## 2018-04-25 RX ADMIN — ACETAMINOPHEN 650 MG: 325 TABLET, FILM COATED ORAL at 11:04

## 2018-04-25 RX ADMIN — ONDANSETRON 4 MG: 2 INJECTION INTRAMUSCULAR; INTRAVENOUS at 12:04

## 2018-04-25 RX ADMIN — ACETAMINOPHEN 650 MG: 325 TABLET, FILM COATED ORAL at 05:04

## 2018-04-25 RX ADMIN — DULOXETINE HYDROCHLORIDE 20 MG: 20 CAPSULE, DELAYED RELEASE ORAL at 08:04

## 2018-04-25 NOTE — PROGRESS NOTES
"[facility] Hospital Medicine  Progress Note    Patient Name: Daija Edward  MRN: [id number]  Patient Class: IP- Inpatient   Admission Date: 4/24/2018  Length of Stay: 0 days  Attending Physician: Gustavo Roman MD  Primary Care Provider: Kylie Fragoso MD        Subjective:     Principal Problem:Elevated INR    HPI:  Daija Edward is a 89 year female with a PMHx of CHF, A-fib, and Migraines who presented to the Emergency Department with c/o AMS. Family reports pt stays by herself with sitter care for a couple of hours a day. Pt reportedly having hallucinations of people trying to come into her house. Police department was called twice. Step daughter and grand daughter at bedside also reports that the patient has had a decline in her mental status over the past month, stating that the patient "is more forgetful than she normally is." Family denies hx of Dementia. Pt states she only saw a neurologist for her hx of migraines. Family is in the process of trying to get 24 hr sitter care. ED workup revealed: CBC stable, K+ 5.4, BUN 37, creatinine 1.7, , troponin 0.049. CXR with no acute infiltrates, cardiomegaly. CT head with no acute intracranial findings, chronic small vessel ischemic change. Pt placed in Observation.    Hospital Course:  Ms Edward 89 year old female with PMHx of CHF, A Fib on Coumadin, dementia  and migraines. She presented to [facility] Emergency Room with Altered Mental Status. Family reports she has been experiencing hallucination at home. Sitter present at bedside, reports family plan on 24 hour sitter when discharge. Patient also reports, feeling weakness and nausea. INR 4.5 and this AM INR 4.6. She is at increase risk of falls due to weakness, impaired endurance, impaired functional mobility and gait instability. Admit to Inpatient due elevated INR and increase risk of falls.     Interval History: Patient seen and examined. Vital signs stable. AMS improving. INR 4.6 today, up from " 4.5 yesterday, pharmacy dosing coumadin.    Review of Systems   Constitutional: Positive for fatigue. Negative for chills and fever.   HENT: Negative for congestion, rhinorrhea and sinus pressure.    Respiratory: Negative for apnea, cough, choking, chest tightness, shortness of breath, wheezing and stridor.    Cardiovascular: Negative for chest pain, palpitations and leg swelling.   Gastrointestinal: Negative for abdominal distention, abdominal pain, diarrhea, nausea and vomiting.   Endocrine: Negative for cold intolerance and heat intolerance.   Genitourinary: Negative for difficulty urinating and hematuria.   Musculoskeletal: Positive for gait problem. Negative for arthralgias and joint swelling.   Skin: Positive for wound. Negative for color change, pallor and rash.   Neurological: Positive for weakness. Negative for dizziness, seizures, numbness and headaches.   Psychiatric/Behavioral: Positive for confusion. Negative for agitation. The patient is not nervous/anxious.      Objective:     Vital Signs (Most Recent):  Temp: 97.7 °F (36.5 °C) (04/25/18 1114)  Pulse: (!) 59 (04/25/18 1350)  Resp: 18 (04/25/18 1114)  BP: (!) 123/91 (04/25/18 1114)  SpO2: 97 % (04/25/18 1114) Vital Signs (24h Range):  Temp:  [96.9 °F (36.1 °C)-97.7 °F (36.5 °C)] 97.7 °F (36.5 °C)  Pulse:  [58-79] 59  Resp:  [16-20] 18  SpO2:  [97 %-100 %] 97 %  BP: (123-211)/(67-94) 123/91     Weight: 62.9 kg (138 lb 10.7 oz)  Body mass index is 25.36 kg/m².    Intake/Output Summary (Last 24 hours) at 04/25/18 1439  Last data filed at 04/25/18 0900   Gross per 24 hour   Intake              240 ml   Output              150 ml   Net               90 ml      Physical Exam   Constitutional: She appears cachectic. She appears ill. No distress.   HENT:   Head: Normocephalic and atraumatic.   Cardiovascular: An irregular rhythm present. Exam reveals no gallop and no friction rub.    No murmur heard.  Pulmonary/Chest: No respiratory distress. She has no  wheezes. She has no rales. She exhibits no tenderness.   Abdominal: She exhibits no distension and no mass. There is no tenderness. There is no rebound and no guarding. No hernia.   Musculoskeletal: She exhibits no edema or deformity.   Neurological: She is alert.   Skin: Capillary refill takes less than 2 seconds. Bruising noted. She is not diaphoretic.   Thin skin   Wound to Rt lower leg   Bruising to bilateral arms    Nursing note and vitals reviewed.      Significant Labs:   CBC:   Recent Labs  Lab 04/24/18  1403 04/25/18  0612   WBC 5.79 5.74   HGB 14.4 12.5   HCT 42.1 37.4    182     CMP:   Recent Labs  Lab 04/24/18  1403 04/25/18  0612   * 137   K 5.2* 3.8    106   CO2 20* 24   * 98   BUN 37* 33*   CREATININE 1.7* 1.3   CALCIUM 10.1 9.2   PROT 7.5 6.3   ALBUMIN 3.6 3.2*   BILITOT 0.5 0.5   ALKPHOS 71 67   AST 29 21   ALT 17 15   ANIONGAP 9 7*   EGFRNONAA 25* 35*     Coagulation:   Recent Labs  Lab 04/24/18  1639 04/25/18  0917   INR 4.5* 4.6*   APTT 42.9*  --        Significant Imaging:     Imaging Results          FL PICC Line Placement (xpd) (Final result)  Result time 04/24/18 16:16:24    Final result by Ino Han MD (04/24/18 16:16:24)                 Impression:      1. Successful placement of a  PICC, using ultrasound for access and fluoroscopy to determine position of the PICC.         Electronically signed by: INO HAN MD  Date:     04/24/18  Time:    16:16              Narrative:    Procedure:     Fluoroscopically guided PICC placement.    Clinical History:   Poor IV access    Technique/findings: After the risks, benefits and options of the planned procedure were explained to the  daughter  in full detail,  she  expressed complete understanding and gave  her  informed consent.  Casper Juarez PA-C, assisted in this procedure.  The  left  upper arm was prepped and draped in the usual sterile fashion. Sterile maximum barrier protocol was utilized.  A timeout was  performed. 1% lidocaine was used as a local anesthetic, and under ultrasound guidance, a micropuncture set was used to gain access to the  basilic  vein. Through a peel-away sheath, a 5  [place] dual-lumen PICC trimmed to  37  cm was placed. Fluoroscopy was used to determine the position of the PICC , and the tip is within the SVC  . The PICC was then anchored into place, capped and flushed. A sterile dressing was applied. The patient tolerated the procedure well without complication.                             CT Head Without Contrast (Final result)  Result time 04/24/18 14:13:53    Final result by Ino Han MD (04/24/18 14:13:53)                 Impression:           No CT evidence of acute intracranial abnormality.  Senescent changes.    All CT scans at this facility use dose modulation, iterative reconstruction and/or weight based dosing when appropriate to reduce radiation dose to as low as reasonably achievable.       Electronically signed by: INO HAN MD  Date:     04/24/18  Time:    14:13              Narrative:    Exam:  CT HEAD WITHOUT CONTRAST    Clinical History:  Altered mental status. Confusion/delirium, altered LOC, unexplained     Technique: Axial CT imaging was performed through the head without intravenous contrast.     Comparison:  None     Findings:     Age appropriate generalized cerebral and cerebellar atrophy. Moderate, symmetric, low density changes in the periventricular white matter consistent with chronic small vessel ischemic change.   No intracranial hemorrhage is identified.   No hydrocephalus, midline shift or mass effect.  The calvarium is intact.   Visualized paranasal sinuses and mastoid air cells are clear.   Atheromatous calcifications involve the bilateral cavernous carotid arteries.                             X-Ray Chest 1 View (Final result)  Result time 04/24/18 11:57:37    Final result by Ino Han MD (04/24/18 11:57:37)                 Impression:     No acute  infiltrates.  Cardiomegaly.            Electronically signed by: INO RUSSELL MD  Date:     04/24/18  Time:    11:57              Narrative:    Exam: XR CHEST 1 VIEW    Clinical History: Shortness of breath.  Altered mental status.    Findings:     The lungs are clear. Marked cardiomegaly.  Aortic atherosclerosis.  Posttraumatic and glenohumeral effusion.                            Assessment/Plan:      * Elevated INR    Admit to Inpatient   INR 4.5 on admit on yesterday   INR 4.6 this AM  She is at increase risk of falls due to weakness, impaired endurance, impaired functional mobility and gait instability. Admit to Inpatient due elevated INR and increase risk of falls.   Also has recent history of hallucinations.        Generalized weakness    - PT/OT to evaluate and treat          Acute exacerbation of CHF (congestive heart failure)    -Improved    - Bumex 1 mg BID, can switch back to home PO dose tomorrow     - 2D ECHO showed normal left ventricular systolic function (EF 55-60%) and normal right ventricular systolic function, severe moderate to severe AS.   - Strict I&Os  - Daily weights  - Low salt diet  - Continue BB          Hyperkalemia    - K+ 5.4 on admit yesterday  - K 3.8 this AM  - Monitor           Acute renal failure    - Creatinine 1.7 on admit yesterday   -Improved today creatinine 1.3 today   -Monitor renal function           Atrial fibrillation    - Continue BB   - Pharmacy to dose Coumadin  -INR 4.5 on admit yesterday  and 4.6 this AM   - No sign of bleeding noted          Altered awareness, transient    - Improving   - Pt was experiencing hallucinations of people coming into her house when they were not. Police called out twice. Pt lives alone with sitter availability a couple hours/day  - CXR and UA negative for infectious process  - CT head with no acute intracranial process, chronic small vessel ischemic change  - Pt currently AOO x 3  - Pt and family deny hx of dementia  - Pt and family  reports a recent decline mental status and pt reports she is aware she cant live alone  - Family in process of getting 24 hr sitter care  - Neuro checks  - Social work following for D/C placement            VTE Risk Mitigation         Ordered     warfarin (COUMADIN) tablet 1 mg  Daily      04/24/18 4650              Nicola Curry NP  Department of Hospital Medicine   [facility]

## 2018-04-25 NOTE — PROGRESS NOTES
Daija Edward 's Coumadin will be dosed and monitored by Pharmacy at the request of DON Garcia  Indication: Afib   Target INR is 2-3  INR   Date Value Ref Range Status   04/24/2018 4.5 (H) 0.8 - 1.2 Final     Comment:     Coumadin Therapy:  2.0 - 3.0 for INR for all indicators except mechanical heart valves  and antiphospholipid syndromes which should use 2.5 - 3.5.       Since INR on admit is 4.5 - Patient will have a place pereira dose as a marker only until able to resume home dosing. Pharmacy will continue to get a PT/INR will be monitored daily. Dose adjustments will be made accordingly.      Thank you for allowing us to participate in this patient's care.     Carmen Kan [facility] 4/24/2018 9:56 PM

## 2018-04-25 NOTE — PROGRESS NOTES
Pharmacy Brief Progress Note:    Patient/caregiver educated on warfarin indication, side effects, and drug interactions. Discussed importance of medication compliance and INR monitoring and reviewed signs of abnormal bleeding. Patient/caregiver given warfarin educational handout. Patient/caregiver expressed understanding and had no further questions.    Thank you for allowing us to participate in this patient's care.     Piper Lerner 4/25/2018 11:22 AM

## 2018-04-25 NOTE — SUBJECTIVE & OBJECTIVE
Interval History: Patient seen and examined. Vital signs stable. AMS improving. INR 4.6 today, up from 4.5 yesterday, pharmacy dosing coumadin.    Review of Systems   Constitutional: Positive for fatigue. Negative for chills and fever.   HENT: Negative for congestion, rhinorrhea and sinus pressure.    Respiratory: Negative for apnea, cough, choking, chest tightness, shortness of breath, wheezing and stridor.    Cardiovascular: Negative for chest pain, palpitations and leg swelling.   Gastrointestinal: Negative for abdominal distention, abdominal pain, diarrhea, nausea and vomiting.   Endocrine: Negative for cold intolerance and heat intolerance.   Genitourinary: Negative for difficulty urinating and hematuria.   Musculoskeletal: Positive for gait problem. Negative for arthralgias and joint swelling.   Skin: Positive for wound. Negative for color change, pallor and rash.   Neurological: Positive for weakness. Negative for dizziness, seizures, numbness and headaches.   Psychiatric/Behavioral: Positive for confusion. Negative for agitation. The patient is not nervous/anxious.      Objective:     Vital Signs (Most Recent):  Temp: 97.7 °F (36.5 °C) (04/25/18 1114)  Pulse: (!) 59 (04/25/18 1350)  Resp: 18 (04/25/18 1114)  BP: (!) 123/91 (04/25/18 1114)  SpO2: 97 % (04/25/18 1114) Vital Signs (24h Range):  Temp:  [96.9 °F (36.1 °C)-97.7 °F (36.5 °C)] 97.7 °F (36.5 °C)  Pulse:  [58-79] 59  Resp:  [16-20] 18  SpO2:  [97 %-100 %] 97 %  BP: (123-211)/(67-94) 123/91     Weight: 62.9 kg (138 lb 10.7 oz)  Body mass index is 25.36 kg/m².    Intake/Output Summary (Last 24 hours) at 04/25/18 1439  Last data filed at 04/25/18 0900   Gross per 24 hour   Intake              240 ml   Output              150 ml   Net               90 ml      Physical Exam   Constitutional: She appears cachectic. She appears ill. No distress.   HENT:   Head: Normocephalic and atraumatic.   Cardiovascular: An irregular rhythm present. Exam reveals no  gallop and no friction rub.    No murmur heard.  Pulmonary/Chest: No respiratory distress. She has no wheezes. She has no rales. She exhibits no tenderness.   Abdominal: She exhibits no distension and no mass. There is no tenderness. There is no rebound and no guarding. No hernia.   Musculoskeletal: She exhibits no edema or deformity.   Neurological: She is alert.   Skin: Capillary refill takes less than 2 seconds. Bruising noted. She is not diaphoretic.   Thin skin   Wound to Rt lower leg   Bruising to bilateral arms    Nursing note and vitals reviewed.      Significant Labs:   CBC:   Recent Labs  Lab 04/24/18  1403 04/25/18  0612   WBC 5.79 5.74   HGB 14.4 12.5   HCT 42.1 37.4    182     CMP:   Recent Labs  Lab 04/24/18  1403 04/25/18  0612   * 137   K 5.2* 3.8    106   CO2 20* 24   * 98   BUN 37* 33*   CREATININE 1.7* 1.3   CALCIUM 10.1 9.2   PROT 7.5 6.3   ALBUMIN 3.6 3.2*   BILITOT 0.5 0.5   ALKPHOS 71 67   AST 29 21   ALT 17 15   ANIONGAP 9 7*   EGFRNONAA 25* 35*     Coagulation:   Recent Labs  Lab 04/24/18  1639 04/25/18  0917   INR 4.5* 4.6*   APTT 42.9*  --        Significant Imaging:     Imaging Results          FL PICC Line Placement (xpd) (Final result)  Result time 04/24/18 16:16:24    Final result by Ino Han MD (04/24/18 16:16:24)                 Impression:      1. Successful placement of a  PICC, using ultrasound for access and fluoroscopy to determine position of the PICC.         Electronically signed by: INO HAN MD  Date:     04/24/18  Time:    16:16              Narrative:    Procedure:     Fluoroscopically guided PICC placement.    Clinical History:   Poor IV access    Technique/findings: After the risks, benefits and options of the planned procedure were explained to the  daughter  in full detail,  she  expressed complete understanding and gave  her  informed consent.  Casper Juarez PA-C, assisted in this procedure.  The  left  upper arm was prepped and  draped in the usual sterile fashion. Sterile maximum barrier protocol was utilized.  A timeout was performed. 1% lidocaine was used as a local anesthetic, and under ultrasound guidance, a micropuncture set was used to gain access to the  basilic  vein. Through a peel-away sheath, a 5  [place] dual-lumen PICC trimmed to  37  cm was placed. Fluoroscopy was used to determine the position of the PICC , and the tip is within the SVC  . The PICC was then anchored into place, capped and flushed. A sterile dressing was applied. The patient tolerated the procedure well without complication.                             CT Head Without Contrast (Final result)  Result time 04/24/18 14:13:53    Final result by Ino Han MD (04/24/18 14:13:53)                 Impression:           No CT evidence of acute intracranial abnormality.  Senescent changes.    All CT scans at this facility use dose modulation, iterative reconstruction and/or weight based dosing when appropriate to reduce radiation dose to as low as reasonably achievable.       Electronically signed by: INO HAN MD  Date:     04/24/18  Time:    14:13              Narrative:    Exam:  CT HEAD WITHOUT CONTRAST    Clinical History:  Altered mental status. Confusion/delirium, altered LOC, unexplained     Technique: Axial CT imaging was performed through the head without intravenous contrast.     Comparison:  None     Findings:     Age appropriate generalized cerebral and cerebellar atrophy. Moderate, symmetric, low density changes in the periventricular white matter consistent with chronic small vessel ischemic change.   No intracranial hemorrhage is identified.   No hydrocephalus, midline shift or mass effect.  The calvarium is intact.   Visualized paranasal sinuses and mastoid air cells are clear.   Atheromatous calcifications involve the bilateral cavernous carotid arteries.                             X-Ray Chest 1 View (Final result)  Result time 04/24/18 11:57:37     Final result by Ino Han MD (04/24/18 11:57:37)                 Impression:     No acute infiltrates.  Cardiomegaly.            Electronically signed by: INO HAN MD  Date:     04/24/18  Time:    11:57              Narrative:    Exam: XR CHEST 1 VIEW    Clinical History: Shortness of breath.  Altered mental status.    Findings:     The lungs are clear. Marked cardiomegaly.  Aortic atherosclerosis.  Posttraumatic and glenohumeral effusion.

## 2018-04-25 NOTE — ASSESSMENT & PLAN NOTE
- Improving   - Pt was experiencing hallucinations of people coming into her house when they were not. Police called out twice. Pt lives alone with sitter availability a couple hours/day  - CXR and UA negative for infectious process  - CT head with no acute intracranial process, chronic small vessel ischemic change  - Pt currently AOO x 3  - Pt and family deny hx of dementia  - Pt and family reports a recent decline mental status and pt reports she is aware she cant live alone  - Family in process of getting 24 hr sitter care  - Neuro checks  - Social work following for D/C placement

## 2018-04-25 NOTE — PLAN OF CARE
Problem: Patient Care Overview  Goal: Plan of Care Review  Outcome: Ongoing (interventions implemented as appropriate)  Patient doing this shift. Free from falls and injury, a-fib on the monitor. Pt's personal sitter is at the bedside. No complaints of pain. Will continue to monitor.

## 2018-04-25 NOTE — NURSING
Patient received from ER via stretcher. Bedside report received from MEGGAN Shepherd. Telemetry monitoring initiated, a-fib, heart rate controlled. Patient oriented to room, fall precautions, call light, hourly rounding and room service. Bed low and locked, alarm on, personal sitter at bedside. Please see flow sheet for further interventions.

## 2018-04-25 NOTE — PLAN OF CARE
Problem: Patient Care Overview  Goal: Individualization & Mutuality  Outcome: Ongoing (interventions implemented as appropriate)  Pt AAO   afib on tele  Participated in PT/OT  C/o HA relieved with prn  INR level 4.6 labs in am   Family at bedside

## 2018-04-25 NOTE — ASSESSMENT & PLAN NOTE
- Continue BB   - Pharmacy to dose Coumadin  -INR 4.5 on admit yesterday  and 4.6 this AM   - No sign of bleeding noted

## 2018-04-25 NOTE — HOSPITAL COURSE
Ms Edward 89 year old female with PMHx of CHF, A Fib on Coumadin, dementia  and migraines. She presented to [facility] Emergency Room with Altered Mental Status. Family reports she has been experiencing hallucination at home. Sitter present at bedside, reports family plan on 24 hour sitter when discharge. Patient also reports, feeling weakness and nausea. INR 4.5 and this AM INR 4.6. She is at increase risk of falls due to weakness, impaired endurance, impaired functional mobility and gait instability. Admit to Inpatient due elevated INR and increase risk of falls.     4/26/18. Patient has returned to baseline. INR is trending down and family is now more comfortable for discharge. She will follow up with  for INR checks. Patient would like to be reported to Dr. Burgos. Patient seen and examined on date of discharge and deemed suitable.

## 2018-04-25 NOTE — ASSESSMENT & PLAN NOTE
Admit to Inpatient   INR 4.5 on admit on yesterday   INR 4.6 this AM  She is at increase risk of falls due to weakness, impaired endurance, impaired functional mobility and gait instability. Admit to Inpatient due elevated INR and increase risk of falls.   Also has recent history of hallucinations.

## 2018-04-25 NOTE — ASSESSMENT & PLAN NOTE
- Creatinine 1.7 on admit yesterday   -Improved today creatinine 1.3 today   -Monitor renal function

## 2018-04-25 NOTE — PT/OT/SLP EVAL
Physical Therapy Evaluation    Patient Name:  Daija Edward   MRN:  [id number]    Recommendations:     Discharge Recommendations:  home health PT (CONT. 24 HOUR CARE FROM SITTERS)   Discharge Equipment Recommendations: NONE  Barriers to discharge: None    Assessment:     Daija Edward is a 89 y.o. female admitted with a medical diagnosis of Altered awareness, transient.  She presents with the following impairments/functional limitations:  weakness, impaired endurance, impaired functional mobilty, gait instability, decreased coordination, decreased safety awareness .    Rehab Prognosis:  GOOD; patient would benefit from acute skilled PT services to address these deficits and reach maximum level of function.      Recent Surgery: * No surgery found *      Plan:     During this hospitalization, patient to be seen 5 x/week to address the above listed problems via gait training, therapeutic activities, therapeutic exercises  · Plan of Care Expires:  05/02/18   Plan of Care Reviewed with: patient, caregiver    Subjective     Communicated with NURSE ALEJO prior to session.  Patient found SUPINE IN BED upon PT entry to room, agreeable to evaluation.    P.T. TX. DX: IMPAIRED FUNCTIONAL MOBILITY  Chief Complaint: FEELING POORLY  Patient comments/goals: RETURN HOME  Pain/Comfort:  · Pain Rating 1: 0/10 (C/O FEELING POORLY)    Patients cultural, spiritual, Buddhism conflicts given the current situation:     Living Environment:  PT LIVES AT HOME, HAS ACCESS TO 24 HOUR SITTERS, 1 STORY HOUSE, NO STEPS, INDEP WITH ADL'S, AMB WITH RW LIMITED COMMUNITY DISTANCES WITH SPV  Prior to admission, patients level of function was .  Patient has the following equipment: rollator, walker, rolling, shower chair.  DME owned (not currently used): rolling walker, shower chair and ROLLATOR.  Upon discharge, patient will have assistance from FAMILY AND SITTERS.    Objective:     Patient found with: telemetry     General Precautions: Standard, fall    Orthopedic Precautions:N/A   Braces: N/A     Exams:  · RLE ROM: WFL  · RLE Strength: GROSSLY 3/5  · LLE ROM: WFL  · LLE Strength: GROSSLY 3/5    Functional Mobility:  · Bed Mobility:     · Rolling Right: stand by assistance  · Supine to Sit: stand by assistance  · Transfers:     · Sit to Stand:  minimum assistance with no AD  · Bed to Chair: minimum assistance with  no AD  using  Stand Pivot  · Gait: PT AMB 10' IN ROOM WITH EMERITA, NO AD, LIMITED DISTANCE DUE TO FEELING POORLY  · Balance: POOR+    AM-PAC 6 CLICK MOBILITY  Total Score:17     Therapeutic Activities and Exercises:   PT EDUCATED IN BLE THEREX TO PERFORM WHILE SEATED IN CHAIR    Patient left up in chair with all lines intact, call button in reach, NURSE  notified and SITTER present.    GOALS:    Physical Therapy Goals        Problem: Physical Therapy Goal    Goal Priority Disciplines Outcome Goal Variances Interventions   Physical Therapy Goal     PT/OT, PT      Description:  LTG'S TO BE MET IN 7 DAYS (5-2-18)  1. PT WILL BE NEVIN WITH BED MOBILITY  2. PT WILL REQUIRE SPV FOR TF'S  3. PT WILL ' WITH OR WITHOUT IFEOMA. AD WITH SBA  4. PT WILL TOLERATE BLE THEREX  20 REPS AROM  5. PT WILL DEMO G DYNAMIC BALANCE DURING GAIT                    History:     Past Medical History:   Diagnosis Date    A-fib     Dementia        History reviewed. No pertinent surgical history.    Clinical Decision Making:     History  Co-morbidities and personal factors that may impact the plan of care Examination  Body Structures and Functions, activity limitations and participation restrictions that may impact the plan of care Clinical Presentation   Decision Making/ Complexity Score   Co-morbidities:   [] Time since onset of injury / illness / exacerbation  [] Status of current condition  []Patient's cognitive status and safety concerns    [] Multiple Medical Problems (see med hx)  Personal Factors:   [] Patient's age  [] Prior Level of function   [] Patient's home  situation (environment and family support)  [] Patient's level of motivation  [] Expected progression of patient      HISTORY:(criteria)    [] 91696 - no personal factors/history    [] 80807 - has 1-2 personal factor/comorbidity     [] 97379 - has >3 personal factor/comorbidity     Body Regions:  [] Objective examination findings  [] Head     []  Neck  [] Trunk   [] Upper Extremity  [] Lower Extremity    Body Systems:  [] For communication ability, affect, cognition, language, and learning style: the assessment of the ability to make needs known, consciousness, orientation (person, place, and time), expected emotional /behavioral responses, and learning preferences (eg, learning barriers, education  needs)  [] For the neuromuscular system: a general assessment of gross coordinated movement (eg, balance, gait, locomotion, transfers, and transitions) and motor function  (motor control and motor learning)  [] For the musculoskeletal system: the assessment of gross symmetry, gross range of motion, gross strength, height, and weight  [] For the integumentary system: the assessment of pliability(texture), presence of scar formation, skin color, and skin integrity  [] For cardiovascular/pulmonary system: the assessment of heart rate, respiratory rate, blood pressure, and edema     Activity limitations:    [] Patient's cognitive status and saf ety concerns          [] Status of current condition      [] Weight bearing restriction  [] Cardiopulmunary Restriction    Participation Restrictions:   [] Goals and goal agreement with the patient     [] Rehab potential (prognosis) and probable outcome      Examination of Body System: (criteria)    [] 86921 - addressing 1-2 elements    [] 45134 - addressing a total of 3 or more elements     [] 00164 -  Addressing a total of 4 or more elements         Clinical Presentation: (criteria)  Choose one     On examination of body system using standardized tests and measures patient presents  with (CHOOSE ONE) elements from any of the following: body structures and functions, activity limitations, and/or participation restrictions.  Leading to a clinical presentation that is considered (CHOOSE ONE)                              Clinical Decision Making  (Eval Complexity):  Choose One     Time Tracking:     PT Received On: 04/25/18  PT Start Time: 0800     PT Stop Time: 0825  PT Total Time (min): 25 min     Billable Minutes: Evaluation 15 and Gait Training 10    PT ENCOURAGED TO CALL FOR ASSISTANCE WITH ALL NEEDS DUE TO FALL RISK STATUS, PT/SITTER AGREEABLE    Yulisa Carvajal, PT  04/25/2018

## 2018-04-25 NOTE — CONSULTS
Nutrition Education Note      Reason for Assessment: Coumadin Diet  Dx:  1. Altered awareness, transient    2. CHF (congestive heart failure)    3. Dehydration    4. Elevated troponin        Medical Hx:  Past Medical History:   Diagnosis Date    A-fib     Dementia        General Info: Patient has been on coumadin for many years per family member at bedside  Diet/Supplement PTA:    Diet Education Provided: Vitamin K/Couamdin Drug Nutrient Interaction  Learners: Family member at bedside  All questions and concerns answered. Dietitian's contact information provided.

## 2018-04-25 NOTE — PLAN OF CARE
04/25/18 1231   Discharge Assessment   Assessment Type Discharge Planning Assessment   Confirmed/corrected address and phone number on facesheet? No   Assessment information obtained from? Medical Record   Prior to hospitilization cognitive status: (Patient was experiencing hallucitnations. )   Prior to hospitalization functional status: Assistive Equipment;Needs Assistance   Current cognitive status: Alert/Oriented   Current Functional Status: Assistive Equipment;Needs Assistance   Lives With alone  (Patient has sitters. )   Able to Return to Prior Arrangements unable to determine at this time (comments)   Is patient able to care for self after discharge? Unable to determine at this time (comments)   Equipment Currently Used at Home walker, rolling;shower chair;rollator   Discharge Plan A Other  (Home with 24 hour sitters. )   Discharge Plan B Home;Home Health;Other  (24 hour sitters.)   Patient/Family In Agreement With Plan unable to assess

## 2018-04-26 VITALS
SYSTOLIC BLOOD PRESSURE: 115 MMHG | BODY MASS INDEX: 25.7 KG/M2 | DIASTOLIC BLOOD PRESSURE: 53 MMHG | TEMPERATURE: 98 F | OXYGEN SATURATION: 98 % | HEART RATE: 73 BPM | RESPIRATION RATE: 18 BRPM | WEIGHT: 139.69 LBS | HEIGHT: 62 IN

## 2018-04-26 LAB
ALBUMIN SERPL BCP-MCNC: 3.1 G/DL
ALP SERPL-CCNC: 59 U/L
ALT SERPL W/O P-5'-P-CCNC: 12 U/L
ANION GAP SERPL CALC-SCNC: 8 MMOL/L
AST SERPL-CCNC: 16 U/L
BASOPHILS # BLD AUTO: 0.02 K/UL
BASOPHILS NFR BLD: 0.4 %
BILIRUB SERPL-MCNC: 0.6 MG/DL
BUN SERPL-MCNC: 28 MG/DL
CALCIUM SERPL-MCNC: 9.1 MG/DL
CHLORIDE SERPL-SCNC: 107 MMOL/L
CO2 SERPL-SCNC: 24 MMOL/L
CREAT SERPL-MCNC: 1.5 MG/DL
DIFFERENTIAL METHOD: ABNORMAL
EOSINOPHIL # BLD AUTO: 0.1 K/UL
EOSINOPHIL NFR BLD: 1.9 %
ERYTHROCYTE [DISTWIDTH] IN BLOOD BY AUTOMATED COUNT: 14 %
EST. GFR  (AFRICAN AMERICAN): 34 ML/MIN/1.73 M^2
EST. GFR  (NON AFRICAN AMERICAN): 29 ML/MIN/1.73 M^2
GLUCOSE SERPL-MCNC: 86 MG/DL
HCT VFR BLD AUTO: 37.5 %
HGB BLD-MCNC: 12.3 G/DL
INR PPP: 4.4
LYMPHOCYTES # BLD AUTO: 2.1 K/UL
LYMPHOCYTES NFR BLD: 39.3 %
MCH RBC QN AUTO: 33.3 PG
MCHC RBC AUTO-ENTMCNC: 32.8 G/DL
MCV RBC AUTO: 102 FL
MONOCYTES # BLD AUTO: 0.7 K/UL
MONOCYTES NFR BLD: 13.4 %
NEUTROPHILS # BLD AUTO: 2.4 K/UL
NEUTROPHILS NFR BLD: 45 %
PLATELET # BLD AUTO: 186 K/UL
PMV BLD AUTO: 10 FL
POTASSIUM SERPL-SCNC: 3.9 MMOL/L
PROT SERPL-MCNC: 6 G/DL
PROTHROMBIN TIME: 45.3 SEC
RBC # BLD AUTO: 3.69 M/UL
SODIUM SERPL-SCNC: 139 MMOL/L
WBC # BLD AUTO: 5.22 K/UL

## 2018-04-26 PROCEDURE — 63600175 PHARM REV CODE 636 W HCPCS: Performed by: NURSE PRACTITIONER

## 2018-04-26 PROCEDURE — G8978 MOBILITY CURRENT STATUS: HCPCS | Mod: CK

## 2018-04-26 PROCEDURE — 80053 COMPREHEN METABOLIC PANEL: CPT

## 2018-04-26 PROCEDURE — 85025 COMPLETE CBC W/AUTO DIFF WBC: CPT

## 2018-04-26 PROCEDURE — 96376 TX/PRO/DX INJ SAME DRUG ADON: CPT

## 2018-04-26 PROCEDURE — 97530 THERAPEUTIC ACTIVITIES: CPT

## 2018-04-26 PROCEDURE — S0171 BUMETANIDE 0.5 MG: HCPCS | Performed by: NURSE PRACTITIONER

## 2018-04-26 PROCEDURE — 96375 TX/PRO/DX INJ NEW DRUG ADDON: CPT

## 2018-04-26 PROCEDURE — 25000003 PHARM REV CODE 250: Performed by: SPECIALIST

## 2018-04-26 PROCEDURE — 85610 PROTHROMBIN TIME: CPT

## 2018-04-26 PROCEDURE — 25000003 PHARM REV CODE 250: Performed by: NURSE PRACTITIONER

## 2018-04-26 PROCEDURE — 96374 THER/PROPH/DIAG INJ IV PUSH: CPT

## 2018-04-26 PROCEDURE — G8979 MOBILITY GOAL STATUS: HCPCS | Mod: CI

## 2018-04-26 PROCEDURE — 97110 THERAPEUTIC EXERCISES: CPT

## 2018-04-26 RX ORDER — CETIRIZINE HYDROCHLORIDE 10 MG/1
10 TABLET ORAL DAILY
Status: DISCONTINUED | OUTPATIENT
Start: 2018-04-26 | End: 2018-04-26 | Stop reason: HOSPADM

## 2018-04-26 RX ORDER — WARFARIN SODIUM 5 MG/1
TABLET ORAL
Start: 2018-04-26

## 2018-04-26 RX ORDER — WARFARIN 1 MG/1
1 TABLET ORAL DAILY
Status: DISCONTINUED | OUTPATIENT
Start: 2018-04-27 | End: 2018-04-26 | Stop reason: HOSPADM

## 2018-04-26 RX ADMIN — HYDRALAZINE HYDROCHLORIDE 10 MG: 20 INJECTION INTRAMUSCULAR; INTRAVENOUS at 11:04

## 2018-04-26 RX ADMIN — DORZOLAMIDE HYDROCHLORIDE 1 DROP: 20 SOLUTION/ DROPS OPHTHALMIC at 08:04

## 2018-04-26 RX ADMIN — ACETAMINOPHEN 650 MG: 325 TABLET, FILM COATED ORAL at 11:04

## 2018-04-26 RX ADMIN — FAMOTIDINE 20 MG: 20 TABLET, FILM COATED ORAL at 08:04

## 2018-04-26 RX ADMIN — CETIRIZINE HYDROCHLORIDE 10 MG: 10 TABLET, FILM COATED ORAL at 10:04

## 2018-04-26 RX ADMIN — ACEBUTOLOL HYDROCHLORIDE 200 MG: 200 CAPSULE ORAL at 08:04

## 2018-04-26 RX ADMIN — BUMETANIDE 1 MG: 0.25 INJECTION INTRAMUSCULAR; INTRAVENOUS at 08:04

## 2018-04-26 RX ADMIN — LEVOTHYROXINE SODIUM 88 MCG: 88 TABLET ORAL at 06:04

## 2018-04-26 RX ADMIN — DULOXETINE HYDROCHLORIDE 20 MG: 20 CAPSULE, DELAYED RELEASE ORAL at 08:04

## 2018-04-26 RX ADMIN — ASPIRIN 325 MG ORAL TABLET 325 MG: 325 PILL ORAL at 08:04

## 2018-04-26 NOTE — DISCHARGE SUMMARY
"[facility] Hospital Medicine  Discharge Summary      Patient Name: Daija Edward  MRN: [id number]  Admission Date: 4/24/2018  Hospital Length of Stay: 1 days  Discharge Date and Time:  04/26/2018 12:06 PM  Attending Physician: Gustavo Roman MD   Discharging Provider: David Walker NP  Primary Care Provider: Kylie Fragoso MD      HPI:   Daija Edward is a 89 year female with a PMHx of CHF, A-fib, and Migraines who presented to the Emergency Department with c/o AMS. Family reports pt stays by herself with sitter care for a couple of hours a day. Pt reportedly having hallucinations of people trying to come into her house. Police department was called twice. Step daughter and grand daughter at bedside also reports that the patient has had a decline in her mental status over the past month, stating that the patient "is more forgetful than she normally is." Family denies hx of Dementia. Pt states she only saw a neurologist for her hx of migraines. Family is in the process of trying to get 24 hr sitter care. ED workup revealed: CBC stable, K+ 5.4, BUN 37, creatinine 1.7, , troponin 0.049. CXR with no acute infiltrates, cardiomegaly. CT head with no acute intracranial findings, chronic small vessel ischemic change. Pt placed in Observation.    * No surgery found *      Hospital Course:   Ms Edward 89 year old female with PMHx of CHF, A Fib on Coumadin, dementia  and migraines. She presented to [facility] Emergency Room with Altered Mental Status. Family reports she has been experiencing hallucination at home. Sitter present at bedside, reports family plan on 24 hour sitter when discharge. Patient also reports, feeling weakness and nausea. INR 4.5 and this AM INR 4.6. She is at increase risk of falls due to weakness, impaired endurance, impaired functional mobility and gait instability. Admit to Inpatient due elevated INR and increase risk of falls.     4/26/18. Patient has returned to baseline. INR is " trending down and family is now more comfortable for discharge. She will follow up with  for INR checks. Patient would like to be reported to Dr. Burgos. Patient seen and examined on date of discharge and deemed suitable.      Consults:   Consults         Status Ordering Provider     Inpatient consult to Social Work  Once     Provider:  (Not yet assigned)    Completed LINDA FUENTES     Nutrition Services Referral  Once     Provider:  (Not yet assigned)    Completed LINDA FUENTES     Pharmacy to dose Warfarin consult  Once     Provider:  (Not yet assigned)    Acknowledged LINDA FUENTES          No new Assessment & Plan notes have been filed under this hospital service since the last note was generated.  Service: Hospital Medicine    Final Active Diagnoses:    Diagnosis Date Noted POA    PRINCIPAL PROBLEM:  Elevated INR [R79.1] 04/25/2018 Unknown    Altered awareness, transient [R40.4] 04/24/2018 Yes    Atrial fibrillation [I48.91] 04/24/2018 Yes    Acute renal failure [N17.9] 04/24/2018 Yes    Hyperkalemia [E87.5] 04/24/2018 Yes    Acute exacerbation of CHF (congestive heart failure) [I50.9] 04/24/2018 Yes    Generalized weakness [R53.1] 04/24/2018 Yes      Problems Resolved During this Admission:    Diagnosis Date Noted Date Resolved POA       Discharged Condition: stable    Disposition: Home-Health Care [facility] [place]    Follow Up:  Follow-up Information     [facility].    Specialty:  Home Health Services  Why:  Home Health  Contact information:  [address], [address]  [place] [address]  [phone]             Kylie Fragoso MD.    Specialty:  Family Medicine  Why:  Dr Fragoso's office will call you with an appointment.  Contact information:  [address] [address] [address]  [place] [address] [phone]                 Patient Instructions:     Ambulatory referral to Home Health   Referral Priority: Routine Referral Type: Home Health   Referral Reason: Specialty  Services Required    Requested Specialty: Home Health Services    Number of Visits Requested: 1          Significant Diagnostic Studies: Labs:   CMP   Recent Labs  Lab 04/24/18  1403 04/25/18  0612 04/26/18  0500   * 137 139   K 5.2* 3.8 3.9    106 107   CO2 20* 24 24   * 98 86   BUN 37* 33* 28   CREATININE 1.7* 1.3 1.5*   CALCIUM 10.1 9.2 9.1   PROT 7.5 6.3 6.0   ALBUMIN 3.6 3.2* 3.1*   BILITOT 0.5 0.5 0.6   ALKPHOS 71 67 59   AST 29 21 16   ALT 17 15 12   ANIONGAP 9 7* 8   ESTGFRAFRICA 29* 40* 34*   EGFRNONAA 25* 35* 29*    and CBC   Recent Labs  Lab 04/24/18  1403 04/25/18  0612 04/26/18  0500   WBC 5.79 5.74 5.22   HGB 14.4 12.5 12.3   HCT 42.1 37.4 37.5    182 186       Pending Diagnostic Studies:     None         Medications:  Reconciled Home Medications:      Medication List      CHANGE how you take these medications    warfarin 5 MG tablet  Commonly known as:  COUMADIN  HOLD UNTIL RESUMED BY PCP  What changed:  · how much to take  · how to take this  · when to take this  · additional instructions        CONTINUE taking these medications    acebutolol 200 MG capsule  Commonly known as:  SECTRAL  Take 200 mg by mouth 2 (two) times daily.     bumetanide 2 MG tablet  Commonly known as:  BUMEX  Take 2 mg by mouth once daily.     dorzolamide 2 % ophthalmic solution  Commonly known as:  TRUSOPT  Place 1 drop into both eyes 2 (two) times daily.     DULoxetine 20 MG capsule  Commonly known as:  CYMBALTA  Take 20 mg by mouth once daily.     fluticasone 50 mcg/actuation nasal spray  Commonly known as:  FLONASE  1 spray by Each Nare route once daily.     levothyroxine 88 MCG tablet  Commonly known as:  SYNTHROID  Take 88 mcg by mouth once daily.     LORazepam 0.5 MG tablet  Commonly known as:  ATIVAN  Take 0.5 mg by mouth every 6 (six) hours as needed for Anxiety.            Indwelling Lines/Drains at time of discharge:   Lines/Drains/Airways          No matching active lines, drains, or  airways        Time spent on the discharge of patient: >35 minutes  Patient was seen and examined on the date of discharge and determined to be suitable for discharge.      David Walker NP  Department of Hospital Medicine  [facility]

## 2018-04-26 NOTE — PLAN OF CARE
Contacted by Caitlin primary nurse that patient is ready for discharge. Discussed cm arranging home health and pcp appointment. Contacted patient's daughter Lor Saha @ [phone]. Discussed discharge and home health options. Preferrence letter obtained for [facility]. Imm obtained via telephone. Attempted to schedule follow up appointment with patient's pcp, Dr Kylie Fragoso. Left voice message for md office to schedule a follow up appointment for patient being discharged from hospital. Update to Caitlin , primary nurse. Caitlin to instruct patient to call md if they do not hear from physician office within 24 hrs.      Referral faxed to [facility] via [employer]. Notified Izabel Severino  with [facility]

## 2018-04-26 NOTE — CONSULTS
"Consulted on this 89 y/o F patient due to present on admission wounds to BLE.  Patient awake and alert with sitter at bedside. Bilateral heels reddened and blanchable. Painted with cavilon and floated off mattress with pillow.  Patient turned to right side. Sacrum, coccyx, bilateral buttock assessed with no redness or breakdown noted.  Bilateral shins with multiple bruises noted. Right shin deformity noted, eccymotic, and small healing skin tear noted.  Covered site with foam dressing.  Please see below:    Skin Care Precautions / Pressure Injury Prevention:  1. Follow "Guidelines for Prevention of Pressure Ulcers in At Risk Patients"  These guidelines can be found on the [facility] Intranet by searching "Wound Care / Ostomy Resources"  2. Document wound assessment in [facility] using guidelines in Terra's "Assessment : Wound" procedure  3. Limit the amount of linen/underpad between patient and mattress surface to ONE fitted sheet and ONE covidien underpad - NO draw sheet/briefs.  4. Obtain Easi Cleans Foam Wipes for providing tatiana care - avoid the use of wash cloths to areas affected by IAD.  5. Apply Clear Barrier Ointment to perineal / perirectal areas in a thin even layer to clean dry skin BID and after each episode of pericare  6. Apply sween 24 moisturizer cream to all dry skin after daily bath and prn  7. Obtain foam wedge from materials management to assist with maintaining proper position changes at least q 2hours and document actual position in EPIC q 2hours  8. Elevate heels off mattress on 2 separate pillows placed lengthwise under each leg supporting the leg from knee to ankle.  Document in EPIC flow sheet every 2 hours.  9. .Do NOT elevate HOB greater than 30 degrees unless contraindicated.  10. Remove SCD/Plexi Pulses/NAEEM's every 12 hours for 30 minutes and assess skin underneath these devices for breakdown          "

## 2018-04-26 NOTE — PROGRESS NOTES
Pharmacy Coumadin Consult Note    INR=4.4  Hx of Afib, goal INR=2-3  Continue holding dose.  Home dose:  Coumadin 2.5mg and 5mg every other day  Patient/caregiver was educated.  Pharmacy is consulted to dose.  Daily INR ordered.    Pharmacy will continue to monitor daily INR and resume dosing once INR is within goal range.  Thank you for allowing us to participate in this patient's care.  Piper Lerner Pharm D 4/26/2018 9:23 AM

## 2018-04-26 NOTE — PT/OT/SLP PROGRESS
Physical Therapy  Treatment    Daija Edward   MRN: [id number]   Admitting Diagnosis: Elevated INR    PT Received On: 04/26/18  PT Start Time: 1055     PT Stop Time: 1120    PT Total Time (min): 25 min       Billable Minutes:  Therapeutic Activity 15 and Therapeutic Exercise 10    Treatment Type: Treatment  PT/PTA: PT       General Precautions: Standard, fall  Orthopedic Precautions: N/A     Subjective:  Communicated with NURSE REMEDIOS prior to session.  Pain/Comfort  Pain Rating 1: 8/10  Location - Orientation 1: posterior  Location 1: head  Pain Addressed 1: Nurse notified    Objective:   Patient found with: telemetry    Functional Mobility:  Bed Mobility:   SBA FOR ROLLING AND SUP<>SIT TF    Transfers:  EMERITA FOR BED TO CHAIR TF, CUES FOR UPRIGHT POSTURE AND ATTENTION TO SAFETY TO CLEAR CHAIR    Gait:   PT DECLINED GAIT AT THIS TIME DUE TO C/O BAD HEADACHE, AGREEABLE TO TF TO CHAIR ONLY    Balance:   Static Sit: FAIR  Dynamic Sit: FAIR  Static Stand: FAIR-  Dynamic stand:  POOR+    Therapeutic Activities and Exercises:  PT EDUCATED IN AND PERFORMED BLE THEREX X 15 REPS AROM.  PT REQUIRED TOTAL ASSIST FOR DONNING SOCKS, MAXA FOR DONNING ROBE    AM-PAC 6 CLICK MOBILITY  How much help from another person does this patient currently need?   1 = Unable, Total/Dependent Assistance  2 = A lot, Maximum/Moderate Assistance  3 = A little, Minimum/Contact Guard/Supervision  4 = None, Modified [place]/Independent    Turning over in bed (including adjusting bedclothes, sheets and blankets)?: 4  Sitting down on and standing up from a chair with arms (e.g., wheelchair, bedside commode, etc.): 3  Moving from lying on back to sitting on the side of the bed?: 3  Moving to and from a bed to a chair (including a wheelchair)?: 3  Need to walk in hospital room?: 1  Climbing 3-5 steps with a railing?: 1  Total Score: 15    AM-PAC Raw Score CMS G-Code Modifier Level of Impairment Assistance   6 % Total / Unable   7 - 9 CM 80 -  100% Maximal Assist   10 - 14 CL 60 - 80% Moderate Assist   15 - 19 CK 40 - 60% Moderate Assist   20 - 22 CJ 20 - 40% Minimal Assist   23 CI 1-20% SBA / CGA   24 CH 0% Independent/ Mod I     Patient left up in chair with all lines intact, call button in reach, NURSE notified and DAUGHTER present.    Assessment:  Daija Edward is a 89 y.o. female with a medical diagnosis of Elevated INR and presents with IMPAIRED FUNCTIONAL MOBILITY. PT WILL BENEFIT FROM CONT. SKILLED P.T. TO ADDRESS IMPAIRMENTS    Rehab identified problem list/impairments: Rehab identified problem list/impairments: weakness, impaired endurance, impaired functional mobilty, gait instability, impaired balance, decreased coordination, decreased safety awareness    Rehab potential is good.    Activity tolerance: Good    Discharge recommendations: Discharge Facility/Level Of Care Needs: home health PT     Barriers to discharge: NONE    Equipment recommendations: Equipment Needed After Discharge: none     GOALS:    Physical Therapy Goals     Not on file          Multidisciplinary Problems (Resolved)        Problem: Physical Therapy Goal    Goal Priority Disciplines Outcome Goal Variances Interventions   Physical Therapy Goal   (Resolved)     PT/OT, PT Outcome(s) achieved     Description:  LTG'S TO BE MET IN 7 DAYS (5-2-18)  1. PT WILL BE NEVIN WITH BED MOBILITY  2. PT WILL REQUIRE SPV FOR TF'S  3. PT WILL ' WITH OR WITHOUT IFEOMA. AD WITH SBA  4. PT WILL TOLERATE BLE THEREX  20 REPS AROM  5. PT WILL DEMO G DYNAMIC BALANCE DURING GAIT                  PLAN:    Patient to be seen 5 x/week  to address the above listed problems via gait training, therapeutic activities, therapeutic exercises  Plan of Care expires: 05/02/18  Plan of Care reviewed with: patient, daughter     PT ENCOURAGED TO CALL FOR ASSISTANCE WITH ALL NEEDS DUE TO FALL RISK STATUS, PT AGREEABLE    PT G-Codes  Functional Assessment Tool Used: [facility]  Score: 15  Functional Limitation:  Mobility: Walking and moving around  Mobility: Walking and Moving Around Current Status (): CK  Mobility: Walking and Moving Around Goal Status (): CI    Yulisa Carvajal, PT  04/26/2018

## 2018-04-26 NOTE — PLAN OF CARE
Problem: Patient Care Overview  Goal: Plan of Care Review  Outcome: Ongoing (interventions implemented as appropriate)  Patient stable. Plan of care reviewed. Patient verbalizes understanding. Sitter at bedside to assist patient ADLs.Bed low, wheels locked, bed alarm on, call light in reach. Patient instructed to call for assistance. Will continue to monitor.

## 2018-04-26 NOTE — PROGRESS NOTES
Went to go over discharge with patient and daughter. Daughter is on a conference call for work and would like nurse to come back in a few minutes when she is finished with her call. Will continue to monitor.

## 2018-04-26 NOTE — PROGRESS NOTES
Went over discharge instructions with patient.   Stressed importance of making and keeping all follow ups.   Patient verbalized understanding and has no questions in regards to discharge.  PICC removed by Izabel SANTANA RN, catheter intact.   Telemetry box removed.  Patient wheeled down to waiting car by staff, no distress noted.

## 2018-04-27 ENCOUNTER — PATIENT OUTREACH (OUTPATIENT)
Dept: ADMINISTRATIVE | Facility: CLINIC | Age: 83
End: 2018-04-27

## 2018-04-27 NOTE — PHYSICIAN QUERY
PT Name: Daija Edward  MR #: [id number]    Physician Query Form - Atrial Fibrillation Specificity     CDS/: Ly Dejesus   RN CCDS            Contact information:[email or web address]     This form is a permanent document in the medical record.     Query Date: April 27, 2018    By submitting this query, we are merely seeking further clarification of documentation. Please utilize your independent clinical judgment when addressing the question(s) below.    The medical record contains the following:   Indicators     Supporting Clinical Findings Location in Medical Record   x Atrial Fibrillation PMH of Afib  Afib on tele PN 4/25-4/26    EKG results      Medication      Treatment Continue BB   - Pharmacy to dose Coumadin  -INR 4.5 on admit yesterday  and 4.6 this AM   - No sign of bleeding noted PN 4/25    Other         Provider, please further specify the Atrial Fibrillation diagnosis.    [xx ] Chronic  [  ] Paroxysmal  [  ] Permanent  [  ] Persistent  [  ] Other (please specify): ____________________________  [  ] Clinically Undetermined    Please document in your progress notes daily for the duration of treatment until resolved, and include in your discharge summary.

## 2018-04-27 NOTE — PHYSICIAN QUERY
PT Name: Daija Edward  MR #: [id number]     Physician Query Form - Documentation Clarification      CDS/: Ly Dejesus  RN CCDS             Contact information: [email or web address]    This form is a permanent document in the medical record.     Query Date: April 27, 2018    By submitting this query, we are merely seeking further clarification of documentation. Please utilize your independent clinical judgment when addressing the question(s) below.    The Medical record reflects the following:    Supporting Clinical Findings Location in Medical Record     Acute exacerbation of CHF         -Improved    - Bumex 1 mg BID, can switch back to home PO dose tomorrow      - 2D ECHO showed normal left ventricular systolic function (EF 55-60%) and normal right ventricular systolic function, severe moderate to severe AS.   - Strict I&Os  - Daily weights  - Low salt diet  - Continue BB            H&P /PN 4/25                                                                                      Please further specify the type of CHF.  Thank you.    Provider Use Only      [    ] Acute on Chronic Diastolic CHF    [    ] Acute on Chronic Systolic CHF    [ xx   ] Acute on Chronic Combined Diastolic and Systolic CHF    [    ] Other___valvular heart disease (Aortic Stenosis)_______________________                                                                                                                             [  ] Clinically undetermined

## 2018-04-27 NOTE — PHYSICIAN QUERY
PT Name: Daija Edward  MR #: [id number]     Physician Query Form - Documentation Clarification      CDS/: Ly Dejesus     RN CCDS          Contact information:[email or web address]    This form is a permanent document in the medical record.     Query Date: April 27, 2018    By submitting this query, we are merely seeking further clarification of documentation. Please utilize your independent clinical judgment when addressing the question(s) below.    The Medical record reflects the following:    Supporting Clinical Findings Location in Medical Record     Altered awareness, transient     - Observation  - Pt was experiencing hallucinations of people coming into her house when they were not. Police called out twice. Pt lives alone with sitter availability a couple hours/day  - CXR and UA negative for infectious process  - CT head with no acute intracranial process, chronic small vessel ischemic change  - Pt currently AOO x 3  - Pt and family deny hx of dementia  - Pt and family reports a recent decline mental status and pt reports she is aware she cant live alone  - Family in process of getting 24 hr sitter care  - Neuro checks  - Inpatient consult to social work for discharge planning     Pt with MARYANNE, hyperkalemia and exacerbation of CHF       H&P     AMS improving       PN 4/25                                                                            Doctor, Please specify diagnosis or diagnoses associated with above clinical findings.    Provider Use Only      [ xx   ] Metabolic encephalopathy    [    ] Other_____________________                                                                                                                             [  ] Clinically undetermined

## 2018-04-27 NOTE — PATIENT INSTRUCTIONS
Acebutolol capsules  What is this medicine?  ACEBUTOLOL (a se BYOO toe lole) is a beta-blocker. Beta-blockers reduce the workload on the heart and help it to beat more regularly. This medicine is used to treat high blood pressure and to treat or prevent certain heart rhythm problems.  How should I use this medicine?  Take this medicine by mouth with a glass of water. Follow the directions on the prescription label. You can take this medicine with or without food. Take your doses at regular intervals. Do not take your medicine more often than directed. Do not stop taking this medicine suddenly. This could lead to serious heart-related effects.  Talk to your pediatrician regarding the use of this medicine in children. Special care may be needed.  What side effects may I notice from receiving this medicine?  Side effects that you should report to your doctor or health care professional as soon as possible:  · allergic reactions like skin rash, itching or hives, swelling of the face, lips, or tongue  · breathing problems  · chest pain  · cold, tingling, or numb hands or feet  · confusion  · irregular heartbeat  · muscle aches and pains  · slow heart rate  · sweating  · swollen legs or ankles  · tremor, shakes  · vomiting  Side effects that usually do not require medical attention (report to your doctor or health care professional if they continue or are bothersome):  · anxiety  · change in sex drive or performance  · depression  · diarrhea  · dry or burning eyes  · headache  · nausea  What may interact with this medicine?  This medicine may interact with the following medications:  · certain medicines for blood pressure, heart disease, irregular heart beat  · NSAIDS, medicines for pain and inflammation, like ibuprofen or naproxen  What if I miss a dose?  If you miss a dose, take it as soon as you can. If it is almost time for your next dose, take only that dose. Do not take double or extra doses.  Where should I keep  my medicine?  Keep out of the reach of children.  Store at room temperature between 15 and 30 degrees C (59 and 86 degrees F). Protect from light. Keep container tightly closed. Throw away any unused medicine after the expiration date.  What should I tell my health care provider before I take this medicine?  They need to know if you have any of these conditions:  · diabetes  · heart or vessel disease like slow heartrate, worsening heart failure, heart block, sick sinus syndrome or Raynaud's disease  · kidney disease  · liver disease  · lung or breathing disease, like asthma or emphysema  · pheochromocytoma  · thyroid disease  · an unusual or allergic reaction to acebutolol, other beta-blockers, medicines, foods, dyes, or preservatives  · pregnant or trying to get pregnant  · breast-feeding  What should I watch for while using this medicine?  Visit your doctor or health care professional for regular checks on your progress. Check your heart rate and blood pressure regularly while you are taking this medicine. Ask your doctor or health care professional what your heart rate and blood pressure should be, and when you should contact him or her.  You may get drowsy or dizzy. Do not drive, use machinery, or do anything that needs mental alertness until you know how this drug affects you. Do not stand or sit up quickly, especially if you are an older patient. This reduces the risk of dizzy or fainting spells. Alcohol can make you more drowsy and dizzy. Avoid alcoholic drinks.  This medicine can affect blood sugar levels. If you have diabetes, check with your doctor or health care professional before you change your diet or the dose of your diabetic medicine.  Do not treat yourself for coughs, colds, or pain while you are taking this medicine without asking your doctor or health care professional for advice. Some ingredients may increase your blood pressure.  NOTE:This sheet is a summary. It may not cover all possible  information. If you have questions about this medicine, talk to your doctor, pharmacist, or health care provider. Copyright© 2017 [employer]

## 2018-05-24 ENCOUNTER — TELEPHONE (OUTPATIENT)
Dept: INTERNAL MEDICINE | Facility: CLINIC | Age: 83
End: 2018-05-24

## 2018-05-24 ENCOUNTER — OFFICE VISIT (OUTPATIENT)
Dept: INTERNAL MEDICINE | Facility: CLINIC | Age: 83
End: 2018-05-24
Payer: MEDICARE

## 2018-05-24 ENCOUNTER — LAB VISIT (OUTPATIENT)
Dept: LAB | Facility: HOSPITAL | Age: 83
End: 2018-05-24
Attending: FAMILY MEDICINE
Payer: MEDICARE

## 2018-05-24 VITALS
WEIGHT: 141.56 LBS | SYSTOLIC BLOOD PRESSURE: 132 MMHG | HEIGHT: 62 IN | DIASTOLIC BLOOD PRESSURE: 80 MMHG | HEART RATE: 68 BPM | OXYGEN SATURATION: 98 % | TEMPERATURE: 98 F | BODY MASS INDEX: 26.05 KG/M2

## 2018-05-24 DIAGNOSIS — N30.00 ACUTE CYSTITIS WITHOUT HEMATURIA: Primary | ICD-10-CM

## 2018-05-24 DIAGNOSIS — N30.00 ACUTE CYSTITIS WITHOUT HEMATURIA: ICD-10-CM

## 2018-05-24 DIAGNOSIS — F51.01 PRIMARY INSOMNIA: ICD-10-CM

## 2018-05-24 LAB
BILIRUB UR QL STRIP: NEGATIVE
CLARITY UR: CLEAR
COLOR UR: YELLOW
GLUCOSE UR QL STRIP: NEGATIVE
HGB UR QL STRIP: ABNORMAL
KETONES UR QL STRIP: NEGATIVE
LEUKOCYTE ESTERASE UR QL STRIP: NEGATIVE
NITRITE UR QL STRIP: NEGATIVE
PH UR STRIP: 7 [PH] (ref 5–8)
PROT UR QL STRIP: NEGATIVE
SP GR UR STRIP: 1.01 (ref 1–1.03)
URN SPEC COLLECT METH UR: ABNORMAL

## 2018-05-24 PROCEDURE — 87086 URINE CULTURE/COLONY COUNT: CPT

## 2018-05-24 PROCEDURE — 99213 OFFICE O/P EST LOW 20 MIN: CPT | Mod: PBBFAC,PO | Performed by: FAMILY MEDICINE

## 2018-05-24 PROCEDURE — 99999 PR PBB SHADOW E&M-EST. PATIENT-LVL III: CPT | Mod: PBBFAC,,, | Performed by: FAMILY MEDICINE

## 2018-05-24 PROCEDURE — 81003 URINALYSIS AUTO W/O SCOPE: CPT | Mod: PO

## 2018-05-24 PROCEDURE — 99204 OFFICE O/P NEW MOD 45 MIN: CPT | Mod: S$PBB,,, | Performed by: FAMILY MEDICINE

## 2018-05-24 RX ORDER — CIPROFLOXACIN 500 MG/1
500 TABLET ORAL EVERY 12 HOURS
Qty: 14 TABLET | Refills: 0 | Status: SHIPPED | OUTPATIENT
Start: 2018-05-24 | End: 2018-05-24

## 2018-05-24 RX ORDER — NITROFURANTOIN (MACROCRYSTALS) 100 MG/1
100 CAPSULE ORAL EVERY 12 HOURS
Qty: 14 CAPSULE | Refills: 0 | Status: SHIPPED | OUTPATIENT
Start: 2018-05-24 | End: 2018-06-25 | Stop reason: SDUPTHER

## 2018-05-24 RX ORDER — ZOLPIDEM TARTRATE 10 MG/1
5 TABLET ORAL NIGHTLY PRN
COMMUNITY
End: 2018-05-24 | Stop reason: SDUPTHER

## 2018-05-24 RX ORDER — ZOLPIDEM TARTRATE 10 MG/1
10 TABLET ORAL NIGHTLY PRN
Qty: 30 TABLET | Refills: 3 | Status: SHIPPED | OUTPATIENT
Start: 2018-05-24

## 2018-05-24 NOTE — LETTER
May 24, 2018      Kylie Fragoso MD  [address]  [place] [address]           [facility] Medicine  [address]  [place] [address]  Phone: [phone]  Fax: [phone]          Patient: Daija Edward   MR Number: [id number]   YOB: 1923   Date of Visit: 5/24/2018       Dear Dr. Kylie Fragoso:    Thank you for referring Daija Edward to me for evaluation. Attached you will find relevant portions of my assessment and plan of care.    If you have questions, please do not hesitate to call me. I look forward to following Daija Edward along with you.    Sincerely,    Cecilia Cheatham MD    Enclosure  CC:  No Recipients    If you would like to receive this communication electronically, please contact [email or web address] or [phone] to request more information on [employer] Link access.    For providers and/or their staff who would like to refer a patient to [facility], please contact us through our one-stop-shop provider referral line, [facility] [place], at [phone].    If you feel you have received this communication in error or would no longer like to receive these types of communications, please e-mail [email or web address]

## 2018-05-24 NOTE — PATIENT INSTRUCTIONS
Get plenty of rest; drink lots of fluids with a goal of having light yellow urine every time you go to the bathroom.   Be sure to complete your antibiotic. Let us know if you are not feeling better by tomorrow.

## 2018-05-24 NOTE — TELEPHONE ENCOUNTER
----- Message from Cecilia Cheatham MD sent at 5/24/2018  3:56 PM CDT -----  Contact: [facility]  Sending in a new antibiotic to their pharmacy; please let them know  ----- Message -----  From: Rose Lemus LPN  Sent: 5/24/2018   1:38 PM  To: Cecilia Cheatham MD        ----- Message -----  From: Chantell Ham  Sent: 5/24/2018  12:43 PM  To: [facility] Staff    States the patient has an allergy to cipro an she would like to know if you want to switch to a different antibiotic. Please call [facility] at [phone]. Thank you

## 2018-05-24 NOTE — TELEPHONE ENCOUNTER
----- Message from Cecilia Cheatham MD sent at 5/24/2018  3:58 PM CDT -----  Contact: [facility]  Also--she had NKDA in her chart; I added Cipro but will you go over any allergies when you call and update the chart please? Thanks!  Dr. SALMERON  ----- Message -----  From: Rose Lemus LPN  Sent: 5/24/2018   1:38 PM  To: Cecilia Cheatham MD        ----- Message -----  From: Chantell Ham  Sent: 5/24/2018  12:43 PM  To: [facility] Staff    States the patient has an allergy to cipro an she would like to know if you want to switch to a different antibiotic. Please call [facility] at [phone]. Thank you

## 2018-05-24 NOTE — PROGRESS NOTES
Subjective:       Patient ID: Daija Edward is a 89 y.o. female.    Chief Complaint: Urinary Tract Infection    89 yo female with 3-4 day history of frequent urination and burning with urination. She does not have urinary incontinence.       Urinary Tract Infection    This is a new problem. The current episode started in the past 7 days. The problem occurs every urination. The problem has been gradually worsening. The quality of the pain is described as burning. The pain is at a severity of 7/10. The pain is mild. There has been no fever. She is not sexually active. There is no history of pyelonephritis. Associated symptoms include urgency. Pertinent negatives include no behavior changes, chills, hematuria, nausea or vomiting. She has tried home medications for the symptoms. The treatment provided no relief. Her past medical history is significant for recurrent UTIs.      does not have any pertinent problems on file.  Past Medical History:   Diagnosis Date    A-fib     Dementia      History reviewed. No pertinent surgical history.  History reviewed. No pertinent family history.  Social History     Social History    Marital status:      Spouse name: N/A    Number of children: N/A    Years of education: N/A     Occupational History    Not on file.     Social History Main Topics    Smoking status: Never Smoker    Smokeless tobacco: Never Used    Alcohol use No    Drug use: Unknown    Sexual activity: Not on file     Other Topics Concern    Not on file     Social History Narrative    No narrative on file     Review of Systems   Constitutional: Negative for chills and fever.   Gastrointestinal: Negative for nausea and vomiting.   Genitourinary: Positive for dysuria, pelvic pain and urgency. Negative for hematuria.       Objective:     Vitals:    05/24/18 1113   BP: 132/80   Pulse: 68   Temp: 97.5 °F (36.4 °C)        Physical Exam   Constitutional: She is oriented to person, place, and time. She appears  well-developed and well-nourished.   HENT:   Head: Normocephalic and atraumatic.   Right Ear: External ear normal.   Left Ear: External ear normal.   Nose: Nose normal.   Eyes: Conjunctivae and EOM are normal. Pupils are equal, round, and reactive to light. No scleral icterus.   Cardiovascular: Normal rate and regular rhythm.    Pulmonary/Chest: Effort normal and breath sounds normal.   Abdominal: Soft. There is tenderness.   Suprapubic tenderness; no CVA tenderness   Neurological: She is alert and oriented to person, place, and time.   Wheelchair with right upper ext contracture. No speech abnormality   Psychiatric: She has a normal mood and affect. Her behavior is normal. Judgment and thought content normal.   Nursing note and vitals reviewed.      Assessment:       1. Acute cystitis without hematuria    2. Primary insomnia        Plan:           Problem List Items Addressed This Visit     None      Visit Diagnoses     Acute cystitis without hematuria    -  Primary    Relevant Orders    Urinalysis (Completed)    Urine culture    Primary insomnia        Relevant Medications    zolpidem (AMBIEN) 10 mg Tab      empiric treatment with cipro started; pharmacy called to report allergy; switched to macrobid. U/a with culture today. Refilled ambien and discussed risk/benefit. Patient and her daughter present at visit wish to continue current dose.

## 2018-05-25 LAB — BACTERIA UR CULT: NO GROWTH

## 2018-06-04 ENCOUNTER — TELEPHONE (OUTPATIENT)
Dept: ADMINISTRATIVE | Facility: CLINIC | Age: 83
End: 2018-06-04

## 2018-06-04 NOTE — PROGRESS NOTES
Home Health SOC with [facility] [place]. Dr. Gustavo Roman. SN and PT services.

## 2018-06-25 ENCOUNTER — LAB VISIT (OUTPATIENT)
Dept: LAB | Facility: HOSPITAL | Age: 83
End: 2018-06-25
Attending: FAMILY MEDICINE
Payer: MEDICARE

## 2018-06-25 ENCOUNTER — OFFICE VISIT (OUTPATIENT)
Dept: INTERNAL MEDICINE | Facility: CLINIC | Age: 83
End: 2018-06-25
Payer: MEDICARE

## 2018-06-25 VITALS
HEIGHT: 62 IN | OXYGEN SATURATION: 98 % | WEIGHT: 139.56 LBS | BODY MASS INDEX: 25.68 KG/M2 | SYSTOLIC BLOOD PRESSURE: 112 MMHG | HEART RATE: 61 BPM | TEMPERATURE: 97 F | DIASTOLIC BLOOD PRESSURE: 68 MMHG

## 2018-06-25 DIAGNOSIS — E03.9 HYPOTHYROIDISM, UNSPECIFIED TYPE: ICD-10-CM

## 2018-06-25 DIAGNOSIS — I50.9 CONGESTIVE HEART FAILURE, UNSPECIFIED HF CHRONICITY, UNSPECIFIED HEART FAILURE TYPE: ICD-10-CM

## 2018-06-25 DIAGNOSIS — Z23 NEED FOR VACCINATION: Primary | ICD-10-CM

## 2018-06-25 LAB
ALBUMIN SERPL BCP-MCNC: 3.7 G/DL
ALP SERPL-CCNC: 74 U/L
ALT SERPL W/O P-5'-P-CCNC: 9 U/L
ANION GAP SERPL CALC-SCNC: 11 MMOL/L
AST SERPL-CCNC: 17 U/L
BASOPHILS # BLD AUTO: 0.04 K/UL
BASOPHILS NFR BLD: 0.7 %
BILIRUB SERPL-MCNC: 0.4 MG/DL
BUN SERPL-MCNC: 37 MG/DL
CALCIUM SERPL-MCNC: 10.5 MG/DL
CHLORIDE SERPL-SCNC: 104 MMOL/L
CO2 SERPL-SCNC: 22 MMOL/L
CREAT SERPL-MCNC: 1.6 MG/DL
DIFFERENTIAL METHOD: ABNORMAL
EOSINOPHIL # BLD AUTO: 0.3 K/UL
EOSINOPHIL NFR BLD: 5.2 %
ERYTHROCYTE [DISTWIDTH] IN BLOOD BY AUTOMATED COUNT: 13.4 %
EST. GFR  (AFRICAN AMERICAN): 31.3 ML/MIN/1.73 M^2
EST. GFR  (NON AFRICAN AMERICAN): 27.2 ML/MIN/1.73 M^2
GLUCOSE SERPL-MCNC: 94 MG/DL
HCT VFR BLD AUTO: 42.9 %
HGB BLD-MCNC: 13.6 G/DL
IMM GRANULOCYTES # BLD AUTO: 0.02 K/UL
IMM GRANULOCYTES NFR BLD AUTO: 0.3 %
LYMPHOCYTES # BLD AUTO: 1.9 K/UL
LYMPHOCYTES NFR BLD: 31.6 %
MCH RBC QN AUTO: 33.9 PG
MCHC RBC AUTO-ENTMCNC: 31.7 G/DL
MCV RBC AUTO: 107 FL
MONOCYTES # BLD AUTO: 0.7 K/UL
MONOCYTES NFR BLD: 11.7 %
NEUTROPHILS # BLD AUTO: 3 K/UL
NEUTROPHILS NFR BLD: 50.5 %
NRBC BLD-RTO: 0 /100 WBC
PLATELET # BLD AUTO: 204 K/UL
PMV BLD AUTO: 11.3 FL
POTASSIUM SERPL-SCNC: 4.7 MMOL/L
PROT SERPL-MCNC: 7.4 G/DL
RBC # BLD AUTO: 4.01 M/UL
SODIUM SERPL-SCNC: 137 MMOL/L
TSH SERPL DL<=0.005 MIU/L-ACNC: 0.59 UIU/ML
WBC # BLD AUTO: 5.99 K/UL

## 2018-06-25 PROCEDURE — 85025 COMPLETE CBC W/AUTO DIFF WBC: CPT

## 2018-06-25 PROCEDURE — 99214 OFFICE O/P EST MOD 30 MIN: CPT | Mod: S$PBB,,, | Performed by: FAMILY MEDICINE

## 2018-06-25 PROCEDURE — 80053 COMPREHEN METABOLIC PANEL: CPT

## 2018-06-25 PROCEDURE — 36415 COLL VENOUS BLD VENIPUNCTURE: CPT | Mod: PO

## 2018-06-25 PROCEDURE — G0009 ADMIN PNEUMOCOCCAL VACCINE: HCPCS | Mod: PBBFAC,PO

## 2018-06-25 PROCEDURE — 84443 ASSAY THYROID STIM HORMONE: CPT

## 2018-06-25 PROCEDURE — 99213 OFFICE O/P EST LOW 20 MIN: CPT | Mod: PBBFAC,PO | Performed by: FAMILY MEDICINE

## 2018-06-25 PROCEDURE — 99999 PR PBB SHADOW E&M-EST. PATIENT-LVL III: CPT | Mod: PBBFAC,,, | Performed by: FAMILY MEDICINE

## 2018-06-25 NOTE — PATIENT INSTRUCTIONS
You are due for the shingles vaccine (Shingrix) and a Tetanus booster. Both are available at your pharmacy or at the [facility] pharmacy in our lobby.     You will get pneumonia vaccine today and are due for a booster in 2 months.

## 2018-06-25 NOTE — PROGRESS NOTES
Subjective:       Patient ID: Daija Edward is a 89 y.o. female.    Chief Complaint: follow-up UTI  89 yo female here to follow-up initial visit with UTI. This has completely resolved. She sees Dr. Janes Burgos in cardiology for her heart failure.No recent labs done. Coumadin managed by cardiology and home health. Daughter reports increasing confusion and refusal to cooperate with baths, etc at times. She would like to eliminate any unnecessary medications once labs have been reviewed. All meds should contribute to quality of life. Appetite and intake are good.        does not have any pertinent problems on file.  Past Medical History:   Diagnosis Date    A-fib     Dementia      History reviewed. No pertinent surgical history.  History reviewed. No pertinent family history.  Social History     Social History    Marital status:      Spouse name: N/A    Number of children: N/A    Years of education: N/A     Occupational History    Not on file.     Social History Main Topics    Smoking status: Never Smoker    Smokeless tobacco: Never Used    Alcohol use No    Drug use: Unknown    Sexual activity: Not on file     Other Topics Concern    Not on file     Social History Narrative    No narrative on file     Review of Systems   Constitutional: Negative for activity change, appetite change and fatigue.   Respiratory: Negative for cough and shortness of breath.    Cardiovascular: Negative for chest pain and leg swelling.   Genitourinary: Negative for difficulty urinating and dysuria.   Musculoskeletal: Positive for arthralgias, back pain and joint swelling.   All other systems reviewed and are negative.      Objective:     Vitals:    06/25/18 1155   BP: 112/68   Pulse: 61   Temp: 97.4 °F (36.3 °C)        Physical Exam   Constitutional: She is oriented to person, place, and time. She appears well-developed and well-nourished.   HENT:   Head: Normocephalic and atraumatic.   Right Ear: External ear normal.    Left Ear: External ear normal.   Nose: Nose normal.   Eyes: Conjunctivae and EOM are normal. Pupils are equal, round, and reactive to light. No scleral icterus.   Neurological: She is alert and oriented to person, place, and time.   Right hemiparesis with contractures. Wheelchair bound. Able to communicate needs; appropriate   Psychiatric: She has a normal mood and affect. Her behavior is normal. Judgment and thought content normal.   Nursing note and vitals reviewed.      Assessment:       1. Need for vaccination    2. Hypothyroidism, unspecified type    3. Congestive heart failure, unspecified HF chronicity, unspecified heart failure type        Plan:           Problem List Items Addressed This Visit     None      Visit Diagnoses     Need for vaccination    -  Primary    Relevant Orders    Pneumococcal Conjugate Vaccine (13 Valent) (IM)    Hypothyroidism, unspecified type        Relevant Orders    CBC auto differential    TSH    Congestive heart failure, unspecified HF chronicity, unspecified heart failure type        Relevant Orders    CBC auto differential    Comprehensive metabolic panel      Will work to pare down medications following lab result review. You are due for the shingles vaccine (Shingrix) and a Tetanus booster. Both are available at your pharmacy or at the [facility] in our lobby.     You will get pneumonia vaccine today and are due for a booster in 2 months.

## 2018-07-01 ENCOUNTER — PATIENT MESSAGE (OUTPATIENT)
Dept: INTERNAL MEDICINE | Facility: CLINIC | Age: 83
End: 2018-07-01

## 2018-07-02 ENCOUNTER — PATIENT MESSAGE (OUTPATIENT)
Dept: INTERNAL MEDICINE | Facility: CLINIC | Age: 83
End: 2018-07-02

## 2018-07-02 RX ORDER — LORAZEPAM 0.5 MG/1
0.5 TABLET ORAL EVERY 6 HOURS PRN
Qty: 60 TABLET | Refills: 0 | Status: SHIPPED | OUTPATIENT
Start: 2018-07-02

## 2018-07-03 ENCOUNTER — TELEPHONE (OUTPATIENT)
Dept: ADMINISTRATIVE | Facility: CLINIC | Age: 83
End: 2018-07-03

## 2018-07-18 ENCOUNTER — TELEPHONE (OUTPATIENT)
Dept: INTERNAL MEDICINE | Facility: CLINIC | Age: 83
End: 2018-07-18
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
## 2018-07-18 DIAGNOSIS — M25.571 RIGHT ANKLE PAIN, UNSPECIFIED CHRONICITY: ICD-10-CM

## 2018-07-18 DIAGNOSIS — M25.471 EDEMA OF RIGHT ANKLE: ICD-10-CM

## 2018-07-18 NOTE — TELEPHONE ENCOUNTER
----- Message from Jose J Walker sent at 7/18/2018 11:28 AM CDT -----  Contact: Lauren- [facility]- [phone]  Would like to consult with the nurse about changes in patient, would like an ordder for an x-ray, pt has had a fall.  Please call back at [phone]. x-

## 2018-07-18 NOTE — PROGRESS NOTES
No abnormality on portable xray of right foot done by home health but with worsening swelling today. Order entered for xrays in radiology department to injury from fall.

## 2018-07-19 ENCOUNTER — TELEPHONE (OUTPATIENT)
Dept: INTERNAL MEDICINE | Facility: CLINIC | Age: 83
End: 2018-07-19

## 2018-07-19 ENCOUNTER — HOSPITAL ENCOUNTER (EMERGENCY)
Facility: HOSPITAL | Age: 83
Discharge: HOME | End: 2018-07-19
Attending: EMERGENCY MEDICINE
Payer: MEDICARE

## 2018-07-19 ENCOUNTER — TELEPHONE (OUTPATIENT)
Dept: PEDIATRICS | Facility: CLINIC | Age: 83
End: 2018-07-19

## 2018-07-19 VITALS
TEMPERATURE: 98 F | HEIGHT: 64 IN | HEART RATE: 70 BPM | OXYGEN SATURATION: 98 % | DIASTOLIC BLOOD PRESSURE: 70 MMHG | SYSTOLIC BLOOD PRESSURE: 143 MMHG | RESPIRATION RATE: 18 BRPM

## 2018-07-19 DIAGNOSIS — Z79.01 CHRONIC ANTICOAGULATION: ICD-10-CM

## 2018-07-19 DIAGNOSIS — M25.511 RIGHT SHOULDER PAIN: ICD-10-CM

## 2018-07-19 DIAGNOSIS — Z91.81 AT HIGH RISK FOR INJURY RELATED TO FALL: ICD-10-CM

## 2018-07-19 DIAGNOSIS — M79.89 FOOT SWELLING: ICD-10-CM

## 2018-07-19 DIAGNOSIS — M79.601 RIGHT ARM PAIN: ICD-10-CM

## 2018-07-19 DIAGNOSIS — M25.473 ANKLE SWELLING: ICD-10-CM

## 2018-07-19 DIAGNOSIS — S42.421A CLOSED DISPLACED COMMINUTED SUPRACONDYLAR FRACTURE OF RIGHT HUMERUS WITHOUT INTERCONDYLAR FRACTURE, INITIAL ENCOUNTER: ICD-10-CM

## 2018-07-19 DIAGNOSIS — S92.301A FRACTURE OF UNSPECIFIED METATARSAL BONE(S), RIGHT FOOT, INITIAL ENCOUNTER FOR CLOSED FRACTURE: Primary | ICD-10-CM

## 2018-07-19 PROCEDURE — 99284 EMERGENCY DEPT VISIT MOD MDM: CPT | Mod: 25

## 2018-07-19 PROCEDURE — 25000003 PHARM REV CODE 250: Performed by: EMERGENCY MEDICINE

## 2018-07-19 PROCEDURE — 29515 APPLICATION SHORT LEG SPLINT: CPT | Mod: RT

## 2018-07-19 RX ORDER — HYDROCODONE BITARTRATE AND ACETAMINOPHEN 5; 325 MG/1; MG/1
1 TABLET ORAL EVERY 4 HOURS PRN
Qty: 18 TABLET | Refills: 0 | Status: SHIPPED | OUTPATIENT
Start: 2018-07-19

## 2018-07-19 RX ORDER — HYDROCODONE BITARTRATE AND ACETAMINOPHEN 5; 325 MG/1; MG/1
1 TABLET ORAL
Status: COMPLETED | OUTPATIENT
Start: 2018-07-19 | End: 2018-07-19

## 2018-07-19 RX ADMIN — HYDROCODONE BITARTRATE AND ACETAMINOPHEN 1 TABLET: 5; 325 TABLET ORAL at 04:07

## 2018-07-19 NOTE — TELEPHONE ENCOUNTER
----- Message from Chantell Ham sent at 7/19/2018 12:49 PM CDT -----  Contact: Patrica/caretaker  States she's calling regarding her xray results. States she needs a bedside pan. States she's in a lot of pain and they would like to no what she can take or if something can be called in.  Please call Patrica at [phone]. Thank you

## 2018-07-19 NOTE — ED PROVIDER NOTES
"SCRIBE #1 NOTE: I, Evangelina Abelardo, am scribing for, and in the presence of, Brandi Najera MD. I have scribed the entire note.      History      Chief Complaint   Patient presents with    Fall     slip and fall with caregiver and heard a "pop" in right arm       Review of patient's allergies indicates:  No Known Allergies     HPI   HPI    7/19/2018, 2:48 PM   History obtained from the patient and caregiver      History of Present Illness: Daija Edward is a 89 y.o. female patient who presents to the Emergency Department for R upper arm pain which onset gradually after slipping and being caught by caregiver. Symptoms are constant and moderate in severity. No mitigating or exacerbating factors reported. Pt is also c/o R foot pain which onset after a fall a few days ago. Pt had an X-ray of her R ankle performed by [facility] after the fall and is requesting results. Patient denies any head injury/trauma, LOC,  HA, dizziness, back pain, neck pain, weakness/numbness, n/v/d, abd pain, CP, SOB, and all other sxs at this time.  Pt takes coumadin daily. No further complaints or concerns at this time.         Arrival mode: EMS    PCP: Cecilia Cheatham MD       Past Medical History:  Past Medical History:   Diagnosis Date    A-fib     Dementia        Past Surgical History:  History reviewed. No pertinent surgical history.      Family History:  History reviewed. No pertinent family history.    Social History:  Social History     Social History Main Topics    Smoking status: Never Smoker    Smokeless tobacco: Never Used    Alcohol use No    Drug use: Unknown    Sexual activity: Unknown       ROS   Review of Systems   Constitutional: Negative for fever.   HENT: Negative for sore throat.    Respiratory: Negative for shortness of breath.    Cardiovascular: Negative for chest pain.   Gastrointestinal: Negative for abdominal pain, diarrhea, nausea and vomiting.   Genitourinary: Negative for dysuria. "   Musculoskeletal: Negative for back pain and neck pain.        + R upper arm pain + R foot pain   Skin: Negative for rash.   Neurological: Negative for dizziness, weakness, numbness and headaches.        - head injury/trauma   All other systems reviewed and are negative.    Physical Exam      Initial Vitals [07/19/18 1408]   BP Pulse Resp Temp SpO2   (!) 143/70 70 18 98 °F (36.7 °C) 98 %      MAP       --          Physical Exam  Nursing Notes and Vital Signs Reviewed.  Constitutional: Patient is in no acute distress. Elderly. Family at bedside.  Head: Atraumatic. Normocephalic.  Eyes: PERRL. EOM intact. Conjunctivae are not pale. No scleral icterus.  ENT: Mucous membranes are moist. Oropharynx is clear and symmetric.    Neck: Supple. Full ROM. No lymphadenopathy.  Cardiovascular: Regular rate. Regular rhythm. No murmurs, rubs, or gallops. Distal pulses are 2+ and symmetric.  Pulmonary/Chest: No respiratory distress. Clear to auscultation bilaterally. No wheezing or rales.  Abdominal: Soft and non-distended.  There is no tenderness.  No rebound, guarding, or rigidity.   Musculoskeletal: Moves all extremities. No obvious deformities. No edema. No calf tenderness.  RUE: Patient reports mild tenderness upon palpation of right upper outer shoulder. No obvious deformity or swelling to R shoulder. No obvious deformity, swelling, or tenderness to R elbow. No pain upon palpation of R elbow with normal ROM. Chronic deformity to R wrist. Old bruising to R forearm with a small skin tear with no active bleeding. Cap refill distally is <2 seconds. Radial pulses are equal and 2+ bilaterally. No motor deficit. No distal sensory deficit.  There is a chronic deformity to right wrist.   RLE: no evident deformity. Old bruising noted to dorsum aspect of R foot with swelling noted.  Cap refill distally is <2 seconds. DP and PT pulses are equal and 2+ bilaterally. No motor deficit. No distal sensory deficit  Skin: Warm and  dry.  Neurological:  Alert, awake, and appropriate.  Normal speech.  No acute focal neurological deficits are appreciated.  Psychiatric: Normal affect. Good eye contact. Appropriate in content.    ED Course    Orthopedic Injury  Date/Time: 7/19/2018 4:55 PM  Performed by: DIMAS MANZO  Authorized by: DIMAS MANZO     Injury:     Injury location:  Foot    Location details:  Right foot    Injury type:  Fracture    Fracture type: second metatarsal, third metatarsal, fourth metatarsal and fifth metatarsal        Pre-procedure assessment:     Neurovascular status: Neurovascularly intact      Distal perfusion: normal      Neurological function: normal      Range of motion: reduced      Local anesthesia used?: No      Patient sedated?: No        Selections made in this section will also lock the Injury type section above.:     Immobilization: Walking boot.    Complications: No      Specimens: No      Implants: No    Post-procedure assessment:     Neurovascular status: Neurovascularly intact      Distal perfusion: normal      Neurological function: normal      Range of motion: splinted      Patient tolerance:  Patient tolerated the procedure well with no immediate complications  Orthopedic Injury  Date/Time: 7/19/2018 4:58 PM  Performed by: DIMAS MANZO  Authorized by: DIMAS MANZO     Injury:     Injury location:  Shoulder    Location details:  Right shoulder    Injury type:  Soft tissue      Pre-procedure assessment:     Neurovascular status: Neurovascularly intact      Distal perfusion: normal      Neurological function: normal      Range of motion: normal      Local anesthesia used?: No      Patient sedated?: No        Selections made in this section will also lock the Injury type section above.:     Immobilization:  Sling    Complications: No      Specimens: No      Implants: No    Post-procedure assessment:     Neurovascular status: Neurovascularly intact      Distal perfusion:  "normal      Neurological function: normal      Range of motion: splinted      Patient tolerance:  Patient tolerated the procedure well with no immediate complications      ED Vital Signs:  Vitals:    07/19/18 1408   BP: (!) 143/70   Pulse: 70   Resp: 18   Temp: 98 °F (36.7 °C)   TempSrc: Oral   SpO2: 98%   Height: 5' 4" (1.626 m)         Imaging Results:  Imaging Results          X-Ray Ankle Complete Right (Final result)  Result time 07/19/18 16:28:49    Final result by HAFSA Escobar Sr., MD (07/19/18 16:28:49)                 Impression:      1. There are acute appearing transverse fractures in the proximal metadiaphyseal portions of the 2nd through 5th metatarsals.  2. The visualized portion of the tibia is heterogeneous in appearance.  There is thickening of the cortex of the diaphyseal portion of the right tibia.  This is characteristic of Paget's disease.  If additional imaging evaluation is clinically indicated, I recommend consideration of a nuclear medicine whole-body bone scan that includes both lower extremities.  3. There is a moderate amount of atherosclerosis.      Electronically signed by: Jeff Escobar MD  Date:    07/19/2018  Time:    16:28             Narrative:    EXAMINATION:  XR ANKLE COMPLETE 3 VIEW RIGHT    CLINICAL HISTORY:  Effusion, unspecified ankle    COMPARISON:  A plain film examination of the right foot performed on 07/19/2018.    FINDINGS:  There are acute appearing transverse fractures in the proximal metadiaphyseal portions of the 2nd through 5th metatarsals. There is no dislocation.  The visualized portion of the tibia is heterogeneous in appearance.  There is thickening of the cortex of the diaphyseal portion of the right tibia.  There is a moderate amount of atherosclerosis.                               X-Ray Foot Complete Right (Final result)  Result time 07/19/18 16:26:06    Final result by HAFSA Escobar Sr., MD (07/19/18 16:26:06)                 Impression:      1. " There are acute appearing transverse fractures in the proximal metadiaphyseal portions of the 2nd through 5th metatarsals.  2. There is a moderate amount of atherosclerosis.      Electronically signed by: Jeff Escobar MD  Date:    07/19/2018  Time:    16:26             Narrative:    EXAMINATION:  XR FOOT COMPLETE 3 VIEW RIGHT    CLINICAL HISTORY:  Other specified soft tissue disorders    COMPARISON:  None    FINDINGS:  There are acute appearing transverse fractures in the proximal metadiaphyseal portions of the 2nd through 5th metatarsals.  There is no dislocation.  There is a moderate amount of atherosclerosis.                               X-Ray Shoulder Trauma Right (Final result)  Result time 07/19/18 16:01:18    Final result by HAFSA Escobar Sr., MD (07/19/18 16:01:18)                 Impression:      1. There is a defect in the lateral aspect of the right humeral head.  If additional imaging evaluation is clinically indicated, I recommend consideration of an MRI examination of the right shoulder without IV contrast.  2. There is deformity of the proximal portion of the right humerus. The right humeral head is not well seen.  This is characteristic of prior trauma.  3. The right glenohumeral joint is not visualized.      Electronically signed by: Jeff Escobar MD  Date:    07/19/2018  Time:    16:01             Narrative:    EXAMINATION:  XR SHOULDER TRAUMA 3 VIEW RIGHT    CLINICAL HISTORY:  Pain in right shoulder    COMPARISON:  A plain film examination of the right humerus performed on 07/19/2018.    FINDINGS:  There is a defect in the lateral aspect of the right humeral head.  There is deformity of the proximal portion of the right humerus.  The right humeral head is not well seen.  The right glenohumeral joint is not visualized.  There is no dislocation.                               X-Ray Humerus 2 View Right (Final result)  Result time 07/19/18 15:08:10    Final result by HAFSA Escobar Sr., MD  (07/19/18 15:08:10)                 Impression:      1. The right glenohumeral joint is not visualized. The proximal portion of the right humerus is heterogeneous in appearance.  2. There is moderate prominence of the soft tissue thickness around the right elbow.  3. An infectious or neoplastic process cannot be excluded in the right upper extremity.      Electronically signed by: Jeff Escobar MD  Date:    07/19/2018  Time:    15:08             Narrative:    EXAMINATION:  XR HUMERUS 2 VIEW RIGHT    CLINICAL HISTORY:  Pain in right arm    COMPARISON:  None    FINDINGS:  The right glenohumeral joint is not visualized.  The proximal portion of the right humerus is heterogeneous in appearance.  There is no dislocation.  There is moderate prominence of the soft tissue thickness around the right elbow.  There is a moderate amount of atherosclerosis.                                        The Emergency Provider reviewed the vital signs and test results, which are outlined above.    ED Discussion     4:25 PM: Re-evaluated pt. Pt is resting comfortably and is in no acute distress.  D/w pt all pertinent results. D/w pt any concerns expressed at this time. Answered all questions. Pt expresses understanding at this time.    4:45 PM: Dr. Najera discussed the pt's case with Dr. Ko (Orthopedics) who recommends a shoulder sling and a hard sole shoe. He will have his office call pt's family tomorrow to set up an out-patient appointment.    5:01 PM: Reassessed pt at this time.  Pt is awake, alert, and in NAD at this time. Discussed with pt all pertinent ED information and results. Discussed pt dx and plan of tx. Gave pt all f/u and return to the ED instructions. All questions and concerns were addressed at this time. Pt expresses understanding of information and instructions, and is comfortable with plan to discharge. Pt is stable for discharge.    Trauma precautions were discussed with patient and/or family/caretaker; I do  not specifically detect any abdominal, thoracic, CNS, orthopedic, or other emergent or life threatening condition and that patient is safe to be discharged.  It was also discussed that despite an unrevealing examination and negative radiographic examination for serious or life threatening injury, these conditions may still exist.  As such, patient should return to ED immediately should they experience, severe or worsening pain, shortness of breath, abdominal pain, headache, vomiting, or any other concern.  It was also discussed that not infrequently, injuries may not be diagnosed during the initial ED visit (such as fractures) and that if the patient discovers a new area of concern, a new area of injury that was not evaluated in the ED, they should return for evaluation as they may have an injury that requires treatment.        ED Medication(s):  Medications   HYDROcodone-acetaminophen 5-325 mg per tablet 1 tablet (1 tablet Oral Given 7/19/18 0734)       New Prescriptions    HYDROCODONE-ACETAMINOPHEN (NORCO) 5-325 MG PER TABLET    Take 1 tablet by mouth every 4 (four) hours as needed for Pain.       Follow-up Information     C Francisco Ko MD. Schedule an appointment as soon as possible for a visit on 7/20/2018.    Specialty:  Orthopedic Surgery  Why:  Return to the Emergency Room, If symptoms worsen  Contact information:  [address]  [address]  [place] [facility] [address]  [phone]                     Medical Decision Making    Medical Decision Making:   Clinical Tests:   Radiological Study: Ordered and Reviewed           Scribe Attestation:   Scribe #1: I performed the above scribed service and the documentation accurately describes the services I performed. I attest to the accuracy of the note.    Attending:   Physician Attestation Statement for Scribe #1: I, Brandi Najera MD, personally performed the services described in this documentation, as scribed by Evangelina  Abelardo, in my presence, and it is both accurate and complete.          Clinical Impression       ICD-10-CM ICD-9-CM   1. Fracture of unspecified metatarsal bone(s), right foot, initial encounter for closed fracture S92.301A 825.25   2. Right arm pain M79.601 729.5   3. Right shoulder pain M25.511 719.41   4. Foot swelling M79.89 729.81   5. Ankle swelling M25.473 719.07   6. Closed displaced comminuted supracondylar fracture of right humerus without intercondylar fracture, initial encounter S42.421A 812.41   7. At high risk for injury related to fall Z91.81 V49.89   8. Chronic anticoagulation Z79.01 V58.61       Disposition:   Disposition: Discharged  Condition: Stable         Brandi Najera MD  07/19/18 1939

## 2018-07-19 NOTE — TELEPHONE ENCOUNTER
Brea on behalf of pt would like to know if you received the results of the xray. I looked I havent seen the final result. Also pt feel again today and they are currently in the hospital. They did xrays on her arm. She states pt is in extreme pain. If you need to call and speak with her the number is Brea [phone]. Please advise./b

## 2018-07-19 NOTE — TELEPHONE ENCOUNTER
----- Message from Dayanna Clement sent at 7/19/2018  3:15 PM CDT -----  Contact: Brea([phone])  Would like to consult with nurse regarding x-ray on patient, please callback at [phone]. Thanks/ar

## 2018-07-20 ENCOUNTER — PATIENT MESSAGE (OUTPATIENT)
Dept: INTERNAL MEDICINE | Facility: CLINIC | Age: 83
End: 2018-07-20

## 2018-07-20 ENCOUNTER — TELEPHONE (OUTPATIENT)
Dept: INTERNAL MEDICINE | Facility: CLINIC | Age: 83
End: 2018-07-20

## 2018-07-20 NOTE — TELEPHONE ENCOUNTER
----- Message from Mignon Kaiser sent at 7/20/2018  2:33 PM CDT -----  Contact: Alia Rojo would like a call back at [phone], Regards to getting a hospital bed.    Thanks  Td

## 2018-07-20 NOTE — ED NOTES
"Attempts to obtain a wheelchair for pt have been unsuccessful. Case Mgmt states they cannot get one until tomorrow. Transportation for pt home w/ [employer] or [employer] is a negative.Pts daughter states if we can obtain a wc she will transport her Mom home she has obtained "help" at home to get pt to and from the car.  "

## 2018-07-20 NOTE — ED NOTES
Иван YOUNGBLOOD has obtained a new  states pt will be billed for it and cushion. Pt assisted into chair and transported to daughters vehicle.+-+

## 2018-07-20 NOTE — TELEPHONE ENCOUNTER
----- Message from Rhonda Collier sent at 7/20/2018  2:10 PM CDT -----  Contact: [facility] / [place]  Nurse is calling on be half on pt. Family is requesting a hospital bed and they need it urgently. [phone]

## 2018-07-20 NOTE — TELEPHONE ENCOUNTER
----- Message from Rhonda Collier sent at 7/20/2018  2:10 PM CDT -----  Contact: [facility] / [place]  Nurse is calling on be half on pt. Family is requesting a hospital bed and they need it urgently. [phone]

## 2018-07-23 ENCOUNTER — TELEPHONE (OUTPATIENT)
Dept: INTERNAL MEDICINE | Facility: CLINIC | Age: 83
End: 2018-07-23

## 2018-07-23 DIAGNOSIS — G31.1 SENILE DEGENERATION OF BRAIN: Primary | ICD-10-CM

## 2018-07-23 DIAGNOSIS — R53.2 COMPLETE IMMOBILITY DUE TO SEVERE PHYSICAL DISABILITY OR FRAILTY: ICD-10-CM

## 2018-07-23 NOTE — TELEPHONE ENCOUNTER
----- Message from Taylor Bailey sent at 7/23/2018 11:40 AM CDT -----  Contact: [facility]  Caller is requesting a call back from the nurse in regards to pt getting an order for pt to being hospice and they also need pt medical records.  [phone]  Or fax  [phone]

## 2018-07-24 NOTE — TELEPHONE ENCOUNTER
----- Message from Anastacia Smith sent at 7/24/2018 12:48 PM CDT -----  Contact: [facility]  State she needs a call back about the pt needing a order for hospice, She can be reached at [phone] Thanks

## 2018-07-24 NOTE — TELEPHONE ENCOUNTER
Spoke with kenji from hospice as well as jordi (granddaughter) made them aware that the orders have been faxed over . Both verbalized understanding. srb

## 2018-08-03 ENCOUNTER — DOCUMENTATION ONLY (OUTPATIENT)
Dept: ADMINISTRATIVE | Facility: HOSPITAL | Age: 83
End: 2018-08-03

## 2018-08-03 NOTE — NURSING
Per documentation received from [facility] on 18. This patient  on 18 at 1:30 PM at home with family members. Please see media.

## 2022-09-25 NOTE — PLAN OF CARE
04/27/18 0823   Final Note   Assessment Type Final Discharge Note   Discharge Disposition Home-Health   Right Care Referral Info   Post Acute Recommendation Home-care   Facility Name [facility]      negative...

## 2022-12-21 NOTE — TELEPHONE ENCOUNTER
Lisa from [facility] is requesting an order for an xray for right foot and ankle. Pt fell last week and they did a portable xray everything was fine today she is extremely swollen . Please advise. /srb   30